# Patient Record
Sex: FEMALE | Race: BLACK OR AFRICAN AMERICAN | NOT HISPANIC OR LATINO | URBAN - METROPOLITAN AREA
[De-identification: names, ages, dates, MRNs, and addresses within clinical notes are randomized per-mention and may not be internally consistent; named-entity substitution may affect disease eponyms.]

---

## 2018-11-05 ENCOUNTER — HOSPITAL ENCOUNTER (EMERGENCY)
Facility: HOSPITAL | Age: 58
Discharge: HOME | End: 2018-11-05
Attending: EMERGENCY MEDICINE
Payer: COMMERCIAL

## 2018-11-05 VITALS
TEMPERATURE: 98 F | HEART RATE: 70 BPM | RESPIRATION RATE: 16 BRPM | SYSTOLIC BLOOD PRESSURE: 138 MMHG | OXYGEN SATURATION: 100 % | DIASTOLIC BLOOD PRESSURE: 79 MMHG

## 2018-11-05 DIAGNOSIS — R06.2 WHEEZING: ICD-10-CM

## 2018-11-05 DIAGNOSIS — R19.7 VOMITING AND DIARRHEA: ICD-10-CM

## 2018-11-05 DIAGNOSIS — M79.10 MYALGIA: Primary | ICD-10-CM

## 2018-11-05 DIAGNOSIS — R11.10 VOMITING AND DIARRHEA: ICD-10-CM

## 2018-11-05 DIAGNOSIS — Z72.0 TOBACCO ABUSE DISORDER: ICD-10-CM

## 2018-11-05 LAB
ALBUMIN SERPL BCP-MCNC: 3.7 G/DL
ALP SERPL-CCNC: 73 U/L
ALT SERPL W/O P-5'-P-CCNC: 15 U/L
ANION GAP SERPL CALC-SCNC: 11 MMOL/L
AST SERPL-CCNC: 24 U/L
BASOPHILS # BLD AUTO: 0.06 K/UL
BASOPHILS NFR BLD: 1.1 %
BILIRUB SERPL-MCNC: 0.8 MG/DL
BUN SERPL-MCNC: 14 MG/DL
CALCIUM SERPL-MCNC: 9 MG/DL
CHLORIDE SERPL-SCNC: 111 MMOL/L
CK SERPL-CCNC: 203 U/L
CO2 SERPL-SCNC: 25 MMOL/L
CREAT SERPL-MCNC: 0.8 MG/DL
DIFFERENTIAL METHOD: ABNORMAL
EOSINOPHIL # BLD AUTO: 0.2 K/UL
EOSINOPHIL NFR BLD: 3.8 %
ERYTHROCYTE [DISTWIDTH] IN BLOOD BY AUTOMATED COUNT: 13.6 %
EST. GFR  (AFRICAN AMERICAN): >60 ML/MIN/1.73 M^2
EST. GFR  (NON AFRICAN AMERICAN): >60 ML/MIN/1.73 M^2
GLUCOSE SERPL-MCNC: 90 MG/DL
HCT VFR BLD AUTO: 40 %
HGB BLD-MCNC: 13 G/DL
LYMPHOCYTES # BLD AUTO: 3.5 K/UL
LYMPHOCYTES NFR BLD: 62.8 %
MAGNESIUM SERPL-MCNC: 2 MG/DL
MCH RBC QN AUTO: 31 PG
MCHC RBC AUTO-ENTMCNC: 32.5 G/DL
MCV RBC AUTO: 95 FL
MONOCYTES # BLD AUTO: 0.3 K/UL
MONOCYTES NFR BLD: 5.4 %
NEUTROPHILS # BLD AUTO: 1.5 K/UL
NEUTROPHILS NFR BLD: 26.9 %
PLATELET # BLD AUTO: 268 K/UL
PMV BLD AUTO: 8.9 FL
POTASSIUM SERPL-SCNC: 3.7 MMOL/L
PROT SERPL-MCNC: 7.1 G/DL
RBC # BLD AUTO: 4.2 M/UL
SODIUM SERPL-SCNC: 147 MMOL/L
WBC # BLD AUTO: 5.51 K/UL

## 2018-11-05 PROCEDURE — 80053 COMPREHEN METABOLIC PANEL: CPT

## 2018-11-05 PROCEDURE — 25000003 PHARM REV CODE 250: Performed by: EMERGENCY MEDICINE

## 2018-11-05 PROCEDURE — 96360 HYDRATION IV INFUSION INIT: CPT

## 2018-11-05 PROCEDURE — 25000242 PHARM REV CODE 250 ALT 637 W/ HCPCS: Performed by: EMERGENCY MEDICINE

## 2018-11-05 PROCEDURE — 83735 ASSAY OF MAGNESIUM: CPT

## 2018-11-05 PROCEDURE — 94640 AIRWAY INHALATION TREATMENT: CPT

## 2018-11-05 PROCEDURE — 99283 EMERGENCY DEPT VISIT LOW MDM: CPT | Mod: 25

## 2018-11-05 PROCEDURE — 82550 ASSAY OF CK (CPK): CPT

## 2018-11-05 PROCEDURE — 85025 COMPLETE CBC W/AUTO DIFF WBC: CPT

## 2018-11-05 RX ORDER — ALBUTEROL SULFATE 90 UG/1
1-2 AEROSOL, METERED RESPIRATORY (INHALATION) EVERY 6 HOURS PRN
Qty: 1 INHALER | Refills: 0 | Status: SHIPPED | OUTPATIENT
Start: 2018-11-05 | End: 2023-06-13

## 2018-11-05 RX ORDER — NAPROXEN 500 MG/1
500 TABLET ORAL 2 TIMES DAILY WITH MEALS
Qty: 30 TABLET | Refills: 0 | Status: SHIPPED | OUTPATIENT
Start: 2018-11-05 | End: 2022-10-27

## 2018-11-05 RX ORDER — ONDANSETRON 4 MG/1
4 TABLET, FILM COATED ORAL EVERY 6 HOURS
Qty: 12 TABLET | Refills: 0 | Status: SHIPPED | OUTPATIENT
Start: 2018-11-05 | End: 2022-10-27

## 2018-11-05 RX ORDER — IPRATROPIUM BROMIDE AND ALBUTEROL SULFATE 2.5; .5 MG/3ML; MG/3ML
3 SOLUTION RESPIRATORY (INHALATION)
Status: COMPLETED | OUTPATIENT
Start: 2018-11-05 | End: 2018-11-05

## 2018-11-05 RX ADMIN — SODIUM CHLORIDE 1000 ML: 0.9 INJECTION, SOLUTION INTRAVENOUS at 10:11

## 2018-11-05 RX ADMIN — IPRATROPIUM BROMIDE AND ALBUTEROL SULFATE 3 ML: .5; 3 SOLUTION RESPIRATORY (INHALATION) at 10:11

## 2018-11-05 NOTE — ED PROVIDER NOTES
"SCRIBE #1 NOTE: I, Concha Bang, am scribing for, and in the presence of, Cayla Harry MD. I have scribed the entire note.       History     Chief Complaint   Patient presents with    Generalized Body Aches     body aches, vomiting and diarrhea     Review of patient's allergies indicates:  No Known Allergies      History of Present Illness     HPI    11/5/2018, 10:10 AM  History obtained from the patient      History of Present Illness: Lucero Townsend is a 57 y.o. female patient with who presents to the Emergency Department for generalized myalgias which onset gradually yesterday. She also c/o a "bad" cough and N/V/D. The pain is constant and moderate in severity. No mitigating or exacerbating factors reported. Patient denies fever, chills, congestion, sore throat, CP, SOB, leg swelling, abd pain, hematemesis, hematochezia, dizziness, lightheadedness, HA, and all other sxs at this time.      Arrival mode: Personal vehicle    PCP: Primary Doctor No      Past Medical History:  Past medical history reviewed not relevant      Past Surgical History:  Past surgical history reviewed not relevant      Family History:  Family history reviewed not relevant      Social History:  Social History    Social History Main Topics    Social History Main Topics    Smoking status: Unknown if ever smoked    Smokeless tobacco: Unknown if ever used    Alcohol Use: Unknown drinking history    Drug Use: Unknown if ever used    Sexual Activity: Unknown      Review of Systems     Review of Systems   Constitutional: Negative for chills and fever.   HENT: Negative for congestion and sore throat.    Respiratory: Positive for cough. Negative for shortness of breath.    Cardiovascular: Negative for chest pain.   Gastrointestinal: Positive for diarrhea, nausea and vomiting. Negative for abdominal pain, anal bleeding, blood in stool and constipation.   Genitourinary: Negative for dysuria.   Musculoskeletal: Positive for myalgias. " Negative for back pain.   Skin: Negative for rash.   Neurological: Negative for dizziness, weakness and light-headedness.   Hematological: Does not bruise/bleed easily.   All other systems reviewed and are negative.     Physical Exam     Initial Vitals [11/05/18 0942]   BP Pulse Resp Temp SpO2   131/80 110 (!) 26 98.5 °F (36.9 °C) 96 %      MAP       --          Physical Exam  Nursing Notes and Vital Signs Reviewed.  Constitutional: Patient is in no acute distress. Well-developed and well-nourished.  Head: Atraumatic. Normocephalic.  Eyes: PERRL. EOM intact. Conjunctivae are not pale. No scleral icterus.  ENT: Nares are clear and patent. Mucous membranes are dry. Oropharynx is clear and symmetric. No exudate. Uvula is midline. No stridor.  Neck: Supple. Full ROM. No lymphadenopathy.  Cardiovascular: Regular rate. Regular rhythm. No murmurs, rubs, or gallops. Distal pulses are 2+ and symmetric.  Pulmonary/Chest: No respiratory distress. There are faint expiratory wheezing. No rales or rhonchi.  Abdominal: Soft and non-distended.  There is no tenderness.  No rebound, guarding, or rigidity. Good bowel sounds.  Musculoskeletal: Moves all extremities. No obvious deformities. No edema. No calf tenderness.  Skin: Warm and dry.  Neurological:  Alert, awake, and appropriate.  Normal speech.  No acute focal neurological deficits are appreciated.  Psychiatric: Normal affect. Good eye contact. Appropriate in content.     ED Course   Procedures  ED Vital Signs:  Vitals:    11/05/18 0942 11/05/18 1011 11/05/18 1042 11/05/18 1112   BP: 131/80  123/68 138/79   Pulse: 110 64 69 70   Resp: (!) 26 20 18 16   Temp: 98.5 °F (36.9 °C)   98.1 °F (36.7 °C)   TempSrc: Oral      SpO2: 96% 100% 100%        Abnormal Lab Results:  Labs Reviewed   CBC W/ AUTO DIFFERENTIAL - Abnormal; Notable for the following components:       Result Value    MPV 8.9 (*)     Gran # (ANC) 1.5 (*)     Gran% 26.9 (*)     Lymph% 62.8 (*)     All other components  within normal limits   COMPREHENSIVE METABOLIC PANEL - Abnormal; Notable for the following components:    Sodium 147 (*)     Chloride 111 (*)     All other components within normal limits   CK - Abnormal; Notable for the following components:     (*)     All other components within normal limits   MAGNESIUM        All Lab Results:  Results for orders placed or performed during the hospital encounter of 11/05/18   CBC auto differential   Result Value Ref Range    WBC 5.51 3.90 - 12.70 K/uL    RBC 4.20 4.00 - 5.40 M/uL    Hemoglobin 13.0 12.0 - 16.0 g/dL    Hematocrit 40.0 37.0 - 48.5 %    MCV 95 82 - 98 fL    MCH 31.0 27.0 - 31.0 pg    MCHC 32.5 32.0 - 36.0 g/dL    RDW 13.6 11.5 - 14.5 %    Platelets 268 150 - 350 K/uL    MPV 8.9 (L) 9.2 - 12.9 fL    Gran # (ANC) 1.5 (L) 1.8 - 7.7 K/uL    Lymph # 3.5 1.0 - 4.8 K/uL    Mono # 0.3 0.3 - 1.0 K/uL    Eos # 0.2 0.0 - 0.5 K/uL    Baso # 0.06 0.00 - 0.20 K/uL    Gran% 26.9 (L) 38.0 - 73.0 %    Lymph% 62.8 (H) 18.0 - 48.0 %    Mono% 5.4 4.0 - 15.0 %    Eosinophil% 3.8 0.0 - 8.0 %    Basophil% 1.1 0.0 - 1.9 %    Differential Method Automated    Comprehensive metabolic panel   Result Value Ref Range    Sodium 147 (H) 136 - 145 mmol/L    Potassium 3.7 3.5 - 5.1 mmol/L    Chloride 111 (H) 95 - 110 mmol/L    CO2 25 23 - 29 mmol/L    Glucose 90 70 - 110 mg/dL    BUN, Bld 14 6 - 20 mg/dL    Creatinine 0.8 0.5 - 1.4 mg/dL    Calcium 9.0 8.7 - 10.5 mg/dL    Total Protein 7.1 6.0 - 8.4 g/dL    Albumin 3.7 3.5 - 5.2 g/dL    Total Bilirubin 0.8 0.1 - 1.0 mg/dL    Alkaline Phosphatase 73 55 - 135 U/L    AST 24 10 - 40 U/L    ALT 15 10 - 44 U/L    Anion Gap 11 8 - 16 mmol/L    eGFR if African American >60 >60 mL/min/1.73 m^2    eGFR if non African American >60 >60 mL/min/1.73 m^2   Magnesium   Result Value Ref Range    Magnesium 2.0 1.6 - 2.6 mg/dL   CPK   Result Value Ref Range     (H) 20 - 180 U/L          The Emergency Provider reviewed the vital signs and test results,  which are outlined above.     ED Discussion   11:10 AM: Discussed with pt all pertinent ED information and results. Discussed pt dx and plan to prescribe an albuterol inhaler for wheezing, Naproxen for body aches, and Zofran for nausea. Gave pt all f/u and return to the ED instructions. All questions and concerns were addressed at this time. Pt expresses understanding of information and instructions, and is comfortable with plan to discharge. Pt is stable for discharge.    I discussed with patient and/or family/caretaker that evaluation in the ED does not suggest any emergent or life threatening medical conditions requiring immediate intervention beyond what was provided in the ED, and I believe patient is safe for discharge.  Regardless, an unremarkable evaluation in the ED does not preclude the development or presence of a serious of life threatening condition. As such, patient was instructed to return immediately for any worsening or change in current symptoms.  ED Medication(s):  Medications   albuterol-ipratropium 2.5 mg-0.5 mg/3 mL nebulizer solution 3 mL (3 mLs Nebulization Given 11/5/18 1011)   sodium chloride 0.9% bolus 1,000 mL (0 mLs Intravenous Stopped 11/5/18 1128)     Current Discharge Medication List      START taking these medications    Details   albuterol (PROVENTIL/VENTOLIN HFA) 90 mcg/actuation inhaler Inhale 1-2 puffs into the lungs every 6 (six) hours as needed for Wheezing. Rescue  Qty: 1 Inhaler, Refills: 0      naproxen (NAPROSYN) 500 MG tablet Take 1 tablet (500 mg total) by mouth 2 (two) times daily with meals.  Qty: 30 tablet, Refills: 0      ondansetron (ZOFRAN) 4 MG tablet Take 1 tablet (4 mg total) by mouth every 6 (six) hours.  Qty: 12 tablet, Refills: 0           Follow-up Information     [facility]. Schedule an appointment as soon as possible for a visit in 2 days.    Why:  Return to the Emergency Room, If symptoms worsen  Contact information:  [id number]  [facility] [id number]  [phone]                    Medical Decision Making     Medical Decision Making:   Clinical Tests:   Lab Tests: Ordered and Reviewed     Additional MDM:   Smoking Cessation: The patient is a smoker. The patient was counseled on smoking cessation for: 3 minutes. The patient was counseled on tobacco related  health complications. Appropriate patient literature was given to the patient concerning tobacco cessation. The patient was counseled on the adverse effects of second hand smoke.      Scribe Attestation:   Scribe #1: I performed the above scribed service and the documentation accurately describes the services I performed. I attest to the accuracy of the note.     Attending:   Physician Attestation Statement for Scribe #1: I, Cayla Harry MD, personally performed the services described in this documentation, as scribed by Concha Bang, in my presence, and it is both accurate and complete.           Clinical Impression       ICD-10-CM ICD-9-CM   1. Myalgia M79.10 729.1   2. Wheezing R06.2 786.07   3. Tobacco abuse disorder Z72.0 305.1   4. Vomiting and diarrhea R11.10 787.03    R19.7 787.91       Disposition:   Disposition: Discharged  Condition: Stable           Cayla Harry MD  11/07/18 0615

## 2018-11-05 NOTE — ED NOTES
Patient does not want to delay DC time to be able to receive remainder of IV fluids. Patient states she has to get to work so she has to leave now. She states I feel better

## 2022-10-27 ENCOUNTER — HOSPITAL ENCOUNTER (OUTPATIENT)
Facility: HOSPITAL | Age: 62
Discharge: HOME | DRG: 872 | End: 2022-10-31
Attending: EMERGENCY MEDICINE | Admitting: INTERNAL MEDICINE
Payer: MEDICAID

## 2022-10-27 DIAGNOSIS — J45.41 MODERATE PERSISTENT ASTHMA WITH ACUTE EXACERBATION: ICD-10-CM

## 2022-10-27 DIAGNOSIS — R79.89 ELEVATED LACTIC ACID LEVEL: ICD-10-CM

## 2022-10-27 DIAGNOSIS — A08.4 GASTROENTERITIS AND COLITIS, VIRAL: ICD-10-CM

## 2022-10-27 DIAGNOSIS — R06.02 SOB (SHORTNESS OF BREATH): ICD-10-CM

## 2022-10-27 DIAGNOSIS — K52.9 COLITIS: Primary | ICD-10-CM

## 2022-10-27 DIAGNOSIS — J10.1 INFLUENZA A: ICD-10-CM

## 2022-10-27 DIAGNOSIS — R07.9 CHEST PAIN: ICD-10-CM

## 2022-10-27 DIAGNOSIS — E87.6 HYPOKALEMIA: ICD-10-CM

## 2022-10-27 DIAGNOSIS — R10.84 GENERALIZED ABDOMINAL PAIN: ICD-10-CM

## 2022-10-27 PROBLEM — J45.901 ASTHMA WITH ACUTE EXACERBATION: Status: ACTIVE | Noted: 2022-10-27

## 2022-10-27 PROBLEM — R11.2 NAUSEA & VOMITING: Status: ACTIVE | Noted: 2022-10-27

## 2022-10-27 PROBLEM — A41.9 SEPSIS: Status: ACTIVE | Noted: 2022-10-27

## 2022-10-27 PROBLEM — E66.9 OBESITY (BMI 30-39.9): Status: ACTIVE | Noted: 2022-10-27

## 2022-10-27 PROBLEM — F17.200 CURRENT EVERY DAY SMOKER: Status: ACTIVE | Noted: 2022-10-27

## 2022-10-27 PROBLEM — D53.9 MACROCYTIC ANEMIA: Status: ACTIVE | Noted: 2022-10-27

## 2022-10-27 PROBLEM — E87.20 METABOLIC ACIDOSIS: Status: ACTIVE | Noted: 2022-10-27

## 2022-10-27 LAB
ALBUMIN SERPL BCP-MCNC: 4 G/DL (ref 3.5–5.2)
ALP SERPL-CCNC: 69 U/L (ref 55–135)
ALT SERPL W/O P-5'-P-CCNC: 10 U/L (ref 10–44)
ANION GAP SERPL CALC-SCNC: 21 MMOL/L (ref 8–16)
AST SERPL-CCNC: 25 U/L (ref 10–40)
BASOPHILS # BLD AUTO: 0.02 K/UL (ref 0–0.2)
BASOPHILS NFR BLD: 0.2 % (ref 0–1.9)
BILIRUB SERPL-MCNC: 0.4 MG/DL (ref 0.1–1)
BNP SERPL-MCNC: 60 PG/ML (ref 0–99)
BUN SERPL-MCNC: 7 MG/DL (ref 6–20)
CALCIUM SERPL-MCNC: 8 MG/DL (ref 8.7–10.5)
CHLORIDE SERPL-SCNC: 104 MMOL/L (ref 95–110)
CK SERPL-CCNC: 235 U/L (ref 20–180)
CO2 SERPL-SCNC: 17 MMOL/L (ref 23–29)
CREAT SERPL-MCNC: 0.9 MG/DL (ref 0.5–1.4)
CTP QC/QA: YES
CTP QC/QA: YES
DIFFERENTIAL METHOD: ABNORMAL
EOSINOPHIL # BLD AUTO: 0 K/UL (ref 0–0.5)
EOSINOPHIL NFR BLD: 0 % (ref 0–8)
ERYTHROCYTE [DISTWIDTH] IN BLOOD BY AUTOMATED COUNT: 17.1 % (ref 11.5–14.5)
EST. GFR  (NO RACE VARIABLE): >60 ML/MIN/1.73 M^2
GLUCOSE SERPL-MCNC: 139 MG/DL (ref 70–110)
HCT VFR BLD AUTO: 32.7 % (ref 37–48.5)
HGB BLD-MCNC: 11.3 G/DL (ref 12–16)
IMM GRANULOCYTES # BLD AUTO: 0.06 K/UL (ref 0–0.04)
IMM GRANULOCYTES NFR BLD AUTO: 0.7 % (ref 0–0.5)
INR PPP: 1.1 (ref 0.8–1.2)
LACTATE SERPL-SCNC: 5.8 MMOL/L (ref 0.5–2.2)
LACTATE SERPL-SCNC: 9.6 MMOL/L (ref 0.5–2.2)
LIPASE SERPL-CCNC: 30 U/L (ref 4–60)
LYMPHOCYTES # BLD AUTO: 0.8 K/UL (ref 1–4.8)
LYMPHOCYTES NFR BLD: 8.2 % (ref 18–48)
MCH RBC QN AUTO: 36.1 PG (ref 27–31)
MCHC RBC AUTO-ENTMCNC: 34.6 G/DL (ref 32–36)
MCV RBC AUTO: 105 FL (ref 82–98)
MONOCYTES # BLD AUTO: 0.2 K/UL (ref 0.3–1)
MONOCYTES NFR BLD: 2.5 % (ref 4–15)
NEUTROPHILS # BLD AUTO: 8 K/UL (ref 1.8–7.7)
NEUTROPHILS NFR BLD: 88.4 % (ref 38–73)
NRBC BLD-RTO: 0 /100 WBC
PLATELET # BLD AUTO: 250 K/UL (ref 150–450)
PMV BLD AUTO: 9.1 FL (ref 9.2–12.9)
POC MOLECULAR INFLUENZA A AGN: POSITIVE
POC MOLECULAR INFLUENZA B AGN: NEGATIVE
POTASSIUM SERPL-SCNC: 2.3 MMOL/L (ref 3.5–5.1)
PROCALCITONIN SERPL IA-MCNC: 0.05 NG/ML
PROT SERPL-MCNC: 6.8 G/DL (ref 6–8.4)
PROTHROMBIN TIME: 11.9 SEC (ref 9–12.5)
RBC # BLD AUTO: 3.13 M/UL (ref 4–5.4)
SARS-COV-2 RDRP RESP QL NAA+PROBE: NEGATIVE
SODIUM SERPL-SCNC: 142 MMOL/L (ref 136–145)
TROPONIN I SERPL DL<=0.01 NG/ML-MCNC: 0.01 NG/ML (ref 0–0.03)
WBC # BLD AUTO: 9.1 K/UL (ref 3.9–12.7)

## 2022-10-27 PROCEDURE — 36415 COLL VENOUS BLD VENIPUNCTURE: CPT | Performed by: NURSE PRACTITIONER

## 2022-10-27 PROCEDURE — 94640 AIRWAY INHALATION TREATMENT: CPT | Mod: XB

## 2022-10-27 PROCEDURE — G0378 HOSPITAL OBSERVATION PER HR: HCPCS

## 2022-10-27 PROCEDURE — 99285 EMERGENCY DEPT VISIT HI MDM: CPT | Mod: 25

## 2022-10-27 PROCEDURE — 96365 THER/PROPH/DIAG IV INF INIT: CPT | Mod: 59

## 2022-10-27 PROCEDURE — 83605 ASSAY OF LACTIC ACID: CPT | Mod: 91 | Performed by: INTERNAL MEDICINE

## 2022-10-27 PROCEDURE — 93010 EKG 12-LEAD: ICD-10-PCS | Mod: ,,, | Performed by: INTERNAL MEDICINE

## 2022-10-27 PROCEDURE — 93010 ELECTROCARDIOGRAM REPORT: CPT | Mod: ,,, | Performed by: INTERNAL MEDICINE

## 2022-10-27 PROCEDURE — 63600175 PHARM REV CODE 636 W HCPCS: Performed by: INTERNAL MEDICINE

## 2022-10-27 PROCEDURE — 21400001 HC TELEMETRY ROOM

## 2022-10-27 PROCEDURE — 25000003 PHARM REV CODE 250: Performed by: NURSE PRACTITIONER

## 2022-10-27 PROCEDURE — 94761 N-INVAS EAR/PLS OXIMETRY MLT: CPT

## 2022-10-27 PROCEDURE — 83880 ASSAY OF NATRIURETIC PEPTIDE: CPT | Performed by: EMERGENCY MEDICINE

## 2022-10-27 PROCEDURE — 96361 HYDRATE IV INFUSION ADD-ON: CPT

## 2022-10-27 PROCEDURE — 84145 PROCALCITONIN (PCT): CPT | Performed by: EMERGENCY MEDICINE

## 2022-10-27 PROCEDURE — 84484 ASSAY OF TROPONIN QUANT: CPT | Performed by: EMERGENCY MEDICINE

## 2022-10-27 PROCEDURE — 27000221 HC OXYGEN, UP TO 24 HOURS

## 2022-10-27 PROCEDURE — 63600175 PHARM REV CODE 636 W HCPCS: Performed by: EMERGENCY MEDICINE

## 2022-10-27 PROCEDURE — 96375 TX/PRO/DX INJ NEW DRUG ADDON: CPT

## 2022-10-27 PROCEDURE — 82550 ASSAY OF CK (CPK): CPT | Performed by: NURSE PRACTITIONER

## 2022-10-27 PROCEDURE — 93005 ELECTROCARDIOGRAM TRACING: CPT

## 2022-10-27 PROCEDURE — 87502 INFLUENZA DNA AMP PROBE: CPT

## 2022-10-27 PROCEDURE — 36415 COLL VENOUS BLD VENIPUNCTURE: CPT | Performed by: INTERNAL MEDICINE

## 2022-10-27 PROCEDURE — 25000242 PHARM REV CODE 250 ALT 637 W/ HCPCS: Performed by: EMERGENCY MEDICINE

## 2022-10-27 PROCEDURE — 25000003 PHARM REV CODE 250: Performed by: INTERNAL MEDICINE

## 2022-10-27 PROCEDURE — 87635 SARS-COV-2 COVID-19 AMP PRB: CPT | Performed by: EMERGENCY MEDICINE

## 2022-10-27 PROCEDURE — 83605 ASSAY OF LACTIC ACID: CPT | Performed by: EMERGENCY MEDICINE

## 2022-10-27 PROCEDURE — 96376 TX/PRO/DX INJ SAME DRUG ADON: CPT

## 2022-10-27 PROCEDURE — 80053 COMPREHEN METABOLIC PANEL: CPT | Performed by: EMERGENCY MEDICINE

## 2022-10-27 PROCEDURE — 83690 ASSAY OF LIPASE: CPT | Performed by: EMERGENCY MEDICINE

## 2022-10-27 PROCEDURE — 25500020 PHARM REV CODE 255: Performed by: EMERGENCY MEDICINE

## 2022-10-27 PROCEDURE — 87040 BLOOD CULTURE FOR BACTERIA: CPT | Performed by: EMERGENCY MEDICINE

## 2022-10-27 PROCEDURE — 85610 PROTHROMBIN TIME: CPT | Performed by: EMERGENCY MEDICINE

## 2022-10-27 PROCEDURE — 25000242 PHARM REV CODE 250 ALT 637 W/ HCPCS: Performed by: INTERNAL MEDICINE

## 2022-10-27 PROCEDURE — 85025 COMPLETE CBC W/AUTO DIFF WBC: CPT | Performed by: EMERGENCY MEDICINE

## 2022-10-27 RX ORDER — ONDANSETRON 2 MG/ML
4 INJECTION INTRAMUSCULAR; INTRAVENOUS EVERY 8 HOURS PRN
Status: DISCONTINUED | OUTPATIENT
Start: 2022-10-27 | End: 2022-10-31 | Stop reason: HOSPADM

## 2022-10-27 RX ORDER — POTASSIUM CHLORIDE 29.8 MG/ML
40 INJECTION INTRAVENOUS
Status: DISCONTINUED | OUTPATIENT
Start: 2022-10-27 | End: 2022-10-27 | Stop reason: CLARIF

## 2022-10-27 RX ORDER — METHYLPREDNISOLONE 4 MG/1
4 TABLET ORAL DAILY
Status: ON HOLD | COMMUNITY
End: 2022-10-31 | Stop reason: HOSPADM

## 2022-10-27 RX ORDER — TALC
6 POWDER (GRAM) TOPICAL NIGHTLY PRN
Status: DISCONTINUED | OUTPATIENT
Start: 2022-10-27 | End: 2022-10-31 | Stop reason: HOSPADM

## 2022-10-27 RX ORDER — SODIUM BICARBONATE 650 MG/1
650 TABLET ORAL 3 TIMES DAILY
Status: COMPLETED | OUTPATIENT
Start: 2022-10-27 | End: 2022-10-29

## 2022-10-27 RX ORDER — IPRATROPIUM BROMIDE AND ALBUTEROL SULFATE 2.5; .5 MG/3ML; MG/3ML
3 SOLUTION RESPIRATORY (INHALATION)
Status: COMPLETED | OUTPATIENT
Start: 2022-10-27 | End: 2022-10-27

## 2022-10-27 RX ORDER — AMLODIPINE BESYLATE 5 MG/1
5 TABLET ORAL 2 TIMES DAILY
Status: DISCONTINUED | OUTPATIENT
Start: 2022-10-27 | End: 2022-10-31 | Stop reason: HOSPADM

## 2022-10-27 RX ORDER — IPRATROPIUM BROMIDE AND ALBUTEROL SULFATE 2.5; .5 MG/3ML; MG/3ML
3 SOLUTION RESPIRATORY (INHALATION) EVERY 6 HOURS
Status: DISCONTINUED | OUTPATIENT
Start: 2022-10-27 | End: 2022-10-29

## 2022-10-27 RX ORDER — METRONIDAZOLE 500 MG/1
500 TABLET ORAL EVERY 8 HOURS
Status: DISCONTINUED | OUTPATIENT
Start: 2022-10-27 | End: 2022-10-31 | Stop reason: HOSPADM

## 2022-10-27 RX ORDER — BUDESONIDE 0.5 MG/2ML
0.5 INHALANT ORAL EVERY 12 HOURS
Status: DISCONTINUED | OUTPATIENT
Start: 2022-10-27 | End: 2022-10-31 | Stop reason: HOSPADM

## 2022-10-27 RX ORDER — CIPROFLOXACIN 500 MG/1
500 TABLET ORAL
Status: ON HOLD | COMMUNITY
End: 2022-10-31 | Stop reason: SDUPTHER

## 2022-10-27 RX ORDER — MORPHINE SULFATE 4 MG/ML
4 INJECTION, SOLUTION INTRAMUSCULAR; INTRAVENOUS
Status: COMPLETED | OUTPATIENT
Start: 2022-10-27 | End: 2022-10-27

## 2022-10-27 RX ORDER — HYDRALAZINE HYDROCHLORIDE 20 MG/ML
10 INJECTION INTRAMUSCULAR; INTRAVENOUS EVERY 8 HOURS PRN
Status: DISCONTINUED | OUTPATIENT
Start: 2022-10-27 | End: 2022-10-31 | Stop reason: HOSPADM

## 2022-10-27 RX ORDER — METRONIDAZOLE 500 MG/1
500 TABLET ORAL
Status: ON HOLD | COMMUNITY
End: 2022-10-31 | Stop reason: SDUPTHER

## 2022-10-27 RX ORDER — OSELTAMIVIR PHOSPHATE 75 MG/1
75 CAPSULE ORAL 2 TIMES DAILY
Status: DISCONTINUED | OUTPATIENT
Start: 2022-10-27 | End: 2022-10-31 | Stop reason: HOSPADM

## 2022-10-27 RX ORDER — ONDANSETRON 2 MG/ML
4 INJECTION INTRAMUSCULAR; INTRAVENOUS ONCE
Status: COMPLETED | OUTPATIENT
Start: 2022-10-27 | End: 2022-10-27

## 2022-10-27 RX ORDER — CIPROFLOXACIN 2 MG/ML
400 INJECTION, SOLUTION INTRAVENOUS
Status: DISCONTINUED | OUTPATIENT
Start: 2022-10-27 | End: 2022-10-31 | Stop reason: HOSPADM

## 2022-10-27 RX ORDER — ACETAMINOPHEN 325 MG/1
650 TABLET ORAL EVERY 4 HOURS PRN
Status: DISCONTINUED | OUTPATIENT
Start: 2022-10-27 | End: 2022-10-31 | Stop reason: HOSPADM

## 2022-10-27 RX ORDER — PANTOPRAZOLE SODIUM 40 MG/10ML
80 INJECTION, POWDER, LYOPHILIZED, FOR SOLUTION INTRAVENOUS ONCE
Status: COMPLETED | OUTPATIENT
Start: 2022-10-28 | End: 2022-10-28

## 2022-10-27 RX ORDER — POTASSIUM CHLORIDE 7.45 MG/ML
10 INJECTION INTRAVENOUS
Status: DISPENSED | OUTPATIENT
Start: 2022-10-27 | End: 2022-10-27

## 2022-10-27 RX ORDER — HYDROCODONE BITARTRATE AND ACETAMINOPHEN 5; 325 MG/1; MG/1
1 TABLET ORAL EVERY 4 HOURS PRN
Status: DISCONTINUED | OUTPATIENT
Start: 2022-10-27 | End: 2022-10-31 | Stop reason: HOSPADM

## 2022-10-27 RX ORDER — HYOSCYAMINE SULFATE 0.12 MG/1
0.12 TABLET SUBLINGUAL EVERY 4 HOURS PRN
Status: DISCONTINUED | OUTPATIENT
Start: 2022-10-27 | End: 2022-10-31 | Stop reason: HOSPADM

## 2022-10-27 RX ORDER — SODIUM CHLORIDE AND POTASSIUM CHLORIDE 150; 450 MG/100ML; MG/100ML
INJECTION, SOLUTION INTRAVENOUS CONTINUOUS
Status: DISPENSED | OUTPATIENT
Start: 2022-10-27 | End: 2022-10-28

## 2022-10-27 RX ORDER — METHYLPREDNISOLONE SOD SUCC 125 MG
125 VIAL (EA) INJECTION
Status: COMPLETED | OUTPATIENT
Start: 2022-10-27 | End: 2022-10-27

## 2022-10-27 RX ORDER — PANTOPRAZOLE SODIUM 40 MG/10ML
40 INJECTION, POWDER, LYOPHILIZED, FOR SOLUTION INTRAVENOUS EVERY 12 HOURS
Status: DISCONTINUED | OUTPATIENT
Start: 2022-10-28 | End: 2022-10-31 | Stop reason: HOSPADM

## 2022-10-27 RX ORDER — CETIRIZINE HYDROCHLORIDE 10 MG/1
10 TABLET ORAL NIGHTLY
Status: DISCONTINUED | OUTPATIENT
Start: 2022-10-27 | End: 2022-10-31 | Stop reason: HOSPADM

## 2022-10-27 RX ORDER — SODIUM CHLORIDE 0.9 % (FLUSH) 0.9 %
10 SYRINGE (ML) INJECTION
Status: DISCONTINUED | OUTPATIENT
Start: 2022-10-27 | End: 2022-10-31 | Stop reason: HOSPADM

## 2022-10-27 RX ORDER — HYDROCODONE BITARTRATE AND ACETAMINOPHEN 10; 325 MG/1; MG/1
1 TABLET ORAL EVERY 4 HOURS PRN
Status: DISCONTINUED | OUTPATIENT
Start: 2022-10-27 | End: 2022-10-31 | Stop reason: HOSPADM

## 2022-10-27 RX ORDER — FAMOTIDINE 20 MG/1
20 TABLET, FILM COATED ORAL 2 TIMES DAILY
Status: DISCONTINUED | OUTPATIENT
Start: 2022-10-27 | End: 2022-10-27

## 2022-10-27 RX ADMIN — ONDANSETRON 4 MG: 2 INJECTION INTRAMUSCULAR; INTRAVENOUS at 03:10

## 2022-10-27 RX ADMIN — CETIRIZINE HYDROCHLORIDE 10 MG: 10 TABLET, FILM COATED ORAL at 08:10

## 2022-10-27 RX ADMIN — AMLODIPINE BESYLATE 5 MG: 5 TABLET ORAL at 08:10

## 2022-10-27 RX ADMIN — IPRATROPIUM BROMIDE AND ALBUTEROL SULFATE 3 ML: 2.5; .5 SOLUTION RESPIRATORY (INHALATION) at 12:10

## 2022-10-27 RX ADMIN — POTASSIUM CHLORIDE 10 MEQ: 7.46 INJECTION, SOLUTION INTRAVENOUS at 05:10

## 2022-10-27 RX ADMIN — BUDESONIDE 0.5 MG: 0.5 INHALANT ORAL at 07:10

## 2022-10-27 RX ADMIN — IOHEXOL 100 ML: 350 INJECTION, SOLUTION INTRAVENOUS at 01:10

## 2022-10-27 RX ADMIN — POTASSIUM CHLORIDE AND SODIUM CHLORIDE: 450; 150 INJECTION, SOLUTION INTRAVENOUS at 09:10

## 2022-10-27 RX ADMIN — SODIUM BICARBONATE 650 MG: 650 TABLET ORAL at 08:10

## 2022-10-27 RX ADMIN — METHYLPREDNISOLONE SODIUM SUCCINATE 125 MG: 125 INJECTION, POWDER, FOR SOLUTION INTRAMUSCULAR; INTRAVENOUS at 01:10

## 2022-10-27 RX ADMIN — POTASSIUM CHLORIDE 10 MEQ: 7.46 INJECTION, SOLUTION INTRAVENOUS at 06:10

## 2022-10-27 RX ADMIN — SODIUM CHLORIDE 1000 ML: 0.9 INJECTION, SOLUTION INTRAVENOUS at 08:10

## 2022-10-27 RX ADMIN — METRONIDAZOLE 500 MG: 500 TABLET ORAL at 09:10

## 2022-10-27 RX ADMIN — CIPROFLOXACIN 400 MG: 2 INJECTION, SOLUTION INTRAVENOUS at 05:10

## 2022-10-27 RX ADMIN — HYDROCODONE BITARTRATE AND ACETAMINOPHEN 1 TABLET: 10; 325 TABLET ORAL at 05:10

## 2022-10-27 RX ADMIN — FAMOTIDINE 20 MG: 20 TABLET ORAL at 08:10

## 2022-10-27 RX ADMIN — HYDRALAZINE HYDROCHLORIDE 10 MG: 20 INJECTION, SOLUTION INTRAMUSCULAR; INTRAVENOUS at 05:10

## 2022-10-27 RX ADMIN — OSELTAMIVIR PHOSPHATE 75 MG: 75 CAPSULE ORAL at 08:10

## 2022-10-27 RX ADMIN — SODIUM CHLORIDE, SODIUM LACTATE, POTASSIUM CHLORIDE, AND CALCIUM CHLORIDE 3039 ML: .6; .31; .03; .02 INJECTION, SOLUTION INTRAVENOUS at 03:10

## 2022-10-27 RX ADMIN — MORPHINE SULFATE 4 MG: 4 INJECTION INTRAVENOUS at 03:10

## 2022-10-27 RX ADMIN — METHYLPREDNISOLONE SODIUM SUCCINATE 40 MG: 40 INJECTION, POWDER, FOR SOLUTION INTRAMUSCULAR; INTRAVENOUS at 05:10

## 2022-10-27 RX ADMIN — IPRATROPIUM BROMIDE AND ALBUTEROL SULFATE 3 ML: 2.5; .5 SOLUTION RESPIRATORY (INHALATION) at 07:10

## 2022-10-27 RX ADMIN — POTASSIUM CHLORIDE 10 MEQ: 7.46 INJECTION, SOLUTION INTRAVENOUS at 03:10

## 2022-10-27 NOTE — ASSESSMENT & PLAN NOTE
Body mass index is 32.98 kg/m². Morbid obesity complicates all aspects of disease management from diagnostic modalities to treatment. Weight loss encouraged and health benefits explained to patient.

## 2022-10-27 NOTE — SUBJECTIVE & OBJECTIVE
Past Medical History:   Diagnosis Date    Asthma        History reviewed. No pertinent surgical history.    Review of patient's allergies indicates:  No Known Allergies    No current facility-administered medications on file prior to encounter.     Current Outpatient Medications on File Prior to Encounter   Medication Sig    albuterol (PROVENTIL/VENTOLIN HFA) 90 mcg/actuation inhaler Inhale 1-2 puffs into the lungs every 6 (six) hours as needed for Wheezing. Rescue    ciprofloxacin HCl (CIPRO) 500 MG tablet Take 500 mg by mouth every 12 (twelve) hours.    methylPREDNISolone (MEDROL) 4 MG Tab Take 4 mg by mouth once daily.    metroNIDAZOLE (FLAGYL) 500 MG tablet Take 500 mg by mouth every 8 (eight) hours.    [DISCONTINUED] naproxen (NAPROSYN) 500 MG tablet Take 1 tablet (500 mg total) by mouth 2 (two) times daily with meals.    [DISCONTINUED] ondansetron (ZOFRAN) 4 MG tablet Take 1 tablet (4 mg total) by mouth every 6 (six) hours.     Family History       Problem Relation (Age of Onset)    Cancer Mother    Diabetes Mother, Father          Tobacco Use    Smoking status: Every Day     Types: Cigarettes    Smokeless tobacco: Never   Substance and Sexual Activity    Alcohol use: Not on file     Comment: occ    Drug use: Never    Sexual activity: Not on file     Review of Systems   Constitutional:  Positive for activity change, appetite change, chills, fatigue and fever.   HENT:  Negative for sore throat.    Eyes:  Negative for visual disturbance.   Respiratory:  Positive for cough and shortness of breath. Negative for chest tightness.    Cardiovascular:  Negative for chest pain, palpitations and leg swelling.   Gastrointestinal:  Positive for abdominal pain, diarrhea, nausea and vomiting. Negative for abdominal distention and constipation.   Endocrine: Negative for polyuria.   Genitourinary:  Negative for decreased urine volume, dysuria, flank pain, frequency and hematuria.   Musculoskeletal:  Negative for back pain and  gait problem.   Skin:  Negative for rash.   Neurological:  Negative for syncope, speech difficulty, weakness, light-headedness and headaches.   Psychiatric/Behavioral:  Negative for confusion, hallucinations and sleep disturbance.    Objective:     Vital Signs (Most Recent):  Temp: 98.7 °F (37.1 °C) (10/27/22 1512)  Pulse: 81 (10/27/22 1715)  Resp: (!) 22 (10/27/22 1723)  BP: (!) 160/74 (10/27/22 1715)  SpO2: 96 % (10/27/22 1715)   Vital Signs (24h Range):  Temp:  [98.2 °F (36.8 °C)-98.9 °F (37.2 °C)] 98.7 °F (37.1 °C)  Pulse:  [] 81  Resp:  [20-30] 22  SpO2:  [92 %-99 %] 96 %  BP: (140-194)/(74-90) 160/74     Weight: 101.3 kg (223 lb 5.2 oz)  Body mass index is 32.98 kg/m².    Physical Exam  Constitutional:       General: She is not in acute distress.     Appearance: She is well-developed. She is ill-appearing. She is not diaphoretic.   HENT:      Head: Normocephalic and atraumatic.      Mouth/Throat:      Mouth: Mucous membranes are dry.      Pharynx: No oropharyngeal exudate.   Eyes:      Conjunctiva/sclera: Conjunctivae normal.      Pupils: Pupils are equal, round, and reactive to light.   Neck:      Thyroid: No thyromegaly.      Vascular: No JVD.   Cardiovascular:      Rate and Rhythm: Normal rate and regular rhythm.      Heart sounds: Normal heart sounds. No murmur heard.  Pulmonary:      Effort: Pulmonary effort is normal. No respiratory distress.      Breath sounds: Examination of the right-upper field reveals wheezing and rhonchi. Examination of the left-upper field reveals wheezing and rhonchi. Examination of the right-middle field reveals wheezing and rhonchi. Examination of the left-middle field reveals wheezing and rhonchi. Examination of the right-lower field reveals wheezing and rhonchi. Examination of the left-lower field reveals wheezing and rhonchi. Wheezing and rhonchi present. No rales.   Chest:      Chest wall: No tenderness.   Abdominal:      General: Bowel sounds are normal. There is no  distension.      Palpations: Abdomen is soft.      Tenderness: There is abdominal tenderness (generalzied , more across lower abd , worst LLQ). There is no guarding or rebound.   Musculoskeletal:         General: Normal range of motion.      Cervical back: Normal range of motion and neck supple.      Right lower leg: No edema.      Left lower leg: No edema.   Lymphadenopathy:      Cervical: No cervical adenopathy.   Skin:     General: Skin is warm and dry.      Findings: No rash.   Neurological:      General: No focal deficit present.      Mental Status: She is alert and oriented to person, place, and time.      Cranial Nerves: No cranial nerve deficit.      Sensory: No sensory deficit.      Deep Tendon Reflexes: Reflexes normal.   Psychiatric:         Mood and Affect: Mood normal.         CRANIAL NERVES     CN III, IV, VI   Pupils are equal, round, and reactive to light.     Significant Labs:   Results for orders placed or performed during the hospital encounter of 10/27/22   CBC auto differential   Result Value Ref Range    WBC 9.10 3.90 - 12.70 K/uL    RBC 3.13 (L) 4.00 - 5.40 M/uL    Hemoglobin 11.3 (L) 12.0 - 16.0 g/dL    Hematocrit 32.7 (L) 37.0 - 48.5 %     (H) 82 - 98 fL    MCH 36.1 (H) 27.0 - 31.0 pg    MCHC 34.6 32.0 - 36.0 g/dL    RDW 17.1 (H) 11.5 - 14.5 %    Platelets 250 150 - 450 K/uL    MPV 9.1 (L) 9.2 - 12.9 fL    Immature Granulocytes 0.7 (H) 0.0 - 0.5 %    Gran # (ANC) 8.0 (H) 1.8 - 7.7 K/uL    Immature Grans (Abs) 0.06 (H) 0.00 - 0.04 K/uL    Lymph # 0.8 (L) 1.0 - 4.8 K/uL    Mono # 0.2 (L) 0.3 - 1.0 K/uL    Eos # 0.0 0.0 - 0.5 K/uL    Baso # 0.02 0.00 - 0.20 K/uL    nRBC 0 0 /100 WBC    Gran % 88.4 (H) 38.0 - 73.0 %    Lymph % 8.2 (L) 18.0 - 48.0 %    Mono % 2.5 (L) 4.0 - 15.0 %    Eosinophil % 0.0 0.0 - 8.0 %    Basophil % 0.2 0.0 - 1.9 %    Differential Method Automated    Comprehensive metabolic panel   Result Value Ref Range    Sodium 142 136 - 145 mmol/L    Potassium 2.3 (LL) 3.5 -  5.1 mmol/L    Chloride 104 95 - 110 mmol/L    CO2 17 (L) 23 - 29 mmol/L    Glucose 139 (H) 70 - 110 mg/dL    BUN 7 6 - 20 mg/dL    Creatinine 0.9 0.5 - 1.4 mg/dL    Calcium 8.0 (L) 8.7 - 10.5 mg/dL    Total Protein 6.8 6.0 - 8.4 g/dL    Albumin 4.0 3.5 - 5.2 g/dL    Total Bilirubin 0.4 0.1 - 1.0 mg/dL    Alkaline Phosphatase 69 55 - 135 U/L    AST 25 10 - 40 U/L    ALT 10 10 - 44 U/L    Anion Gap 21 (H) 8 - 16 mmol/L    eGFR >60 >60 mL/min/1.73 m^2   Troponin I   Result Value Ref Range    Troponin I 0.009 0.000 - 0.026 ng/mL   Brain natriuretic peptide   Result Value Ref Range    BNP 60 0 - 99 pg/mL   Protime-INR   Result Value Ref Range    Prothrombin Time 11.9 9.0 - 12.5 sec    INR 1.1 0.8 - 1.2   Lipase   Result Value Ref Range    Lipase 30 4 - 60 U/L   Lactic acid, plasma   Result Value Ref Range    Lactate (Lactic Acid) 5.8 (HH) 0.5 - 2.2 mmol/L   Procalcitonin   Result Value Ref Range    Procalcitonin 0.05 <0.25 ng/mL   POCT COVID-19 Rapid Screening   Result Value Ref Range    POC Rapid COVID Negative Negative     Acceptable Yes    POCT Influenza A/B Molecular   Result Value Ref Range    POC Molecular Influenza A Ag Positive (A) Negative, Not Reported    POC Molecular Influenza B Ag Negative Negative, Not Reported     Acceptable Yes          Significant Imaging:   Imaging Results              CT Abdomen Pelvis With Contrast (Final result)  Result time 10/27/22 14:21:18      Final result by Magdiel Richardson MD (10/27/22 14:21:18)                   Impression:      Mild thickening of the transverse and descending colon which could reflect mild infectious or inflammatory colitis.  No abdominal abscess identified    All CT scans at this facility use dose modulation, iterative reconstruction, and/or weight based dosing when appropriate to reduce radiation dose to as low as reasonable achievable.      Electronically signed by: Magdiel Richardson MD  Date:    10/27/2022  Time:    14:21                Narrative:    EXAMINATION:  CT ABDOMEN PELVIS WITH CONTRAST    CLINICAL HISTORY:  Abdominal abscess/infection suspected;colitis;    TECHNIQUE:  Low dose axial images, sagittal and coronal reformations were obtained from the lung bases to the pubic symphysis following the IV administration of 100 mL of Omnipaque 350.  Oral contrast was not administered.    COMPARISON:  None    FINDINGS:  Heart: Normal size. No effusion.    Lung Bases: Clear.    Liver: Normal size and attenuation. No focal lesions.    Gallbladder: No calcified gallstones.    Bile Ducts: No dilatation.    Pancreas: No mass. No peripancreatic fat stranding.    Spleen: Normal.    Adrenals: Normal.    Kidneys/Ureters: Normal enhancement.  Scarring seen throughout portions of the right kidney.  No mass or  hydroureteronephrosis.    Bladder: No wall thickening.    Reproductive organs: Right ovarian/adnexal cysts versus hydrosalpinx suspected.    GI Tract/Mesentery: No evidence of bowel obstruction..  No evidence of appendicitis.  Large bowel is collapsed which limits evaluation.  Mild thickening of the transverse and descending colon which could reflect mild infectious or inflammatory colitis.    Peritoneal Space: No ascites or free air.    Retroperitoneum: No significant adenopathy.    Abdominal wall: Small fat containing umbilical hernia.    Vasculature: No aneurysm. Aorta demonstrates atherosclerotic disease.    Bones: No acute fracture.  Moderate degenerative changes including facet arthropathy throughout the lower lumbar spine.  Remote left lateral transverse process fractures at L4 and L5.  No suspicious lytic or sclerotic lesions.                                       X-Ray Chest AP Portable (Final result)  Result time 10/27/22 12:43:43      Final result by Joseluis Nj MD (10/27/22 12:43:43)                   Impression:      No acute abnormality.      Electronically signed by: Joseluis Nj  Date:    10/27/2022  Time:    12:43                Narrative:    EXAMINATION:  XR CHEST AP PORTABLE    CLINICAL HISTORY:  Chest pain;.    TECHNIQUE:  Single frontal portable view of the chest was performed.    COMPARISON:  None    FINDINGS:  Support devices: None    Calcified right hilar lymph node.The lungs are clear, with normal appearance of pulmonary vasculature and no pleural effusion or pneumothorax.    The cardiac silhouette is normal in size. The hilar and mediastinal contours are unremarkable.    Bones are intact.

## 2022-10-27 NOTE — ASSESSMENT & PLAN NOTE
Pt meets two SIRS criteria HR>90, RR >20     This patient does have evidence of infective focus  My overall impression is sepsis. Vital signs were reviewed and noted in progress note.  Antibiotics given-   Antibiotics (From admission, onward)    Start     Stop Route Frequency Ordered    10/27/22 2200  metroNIDAZOLE tablet 500 mg         -- Oral Every 8 hours 10/27/22 1454    10/27/22 1600  ciprofloxacin (CIPRO)400mg/200ml D5W IVPB 400 mg         -- IV Every 12 hours (non-standard times) 10/27/22 1454        Cultures were taken-   Microbiology Results (last 7 days)     Procedure Component Value Units Date/Time    Stool culture [[id number]]     Order Status: No result Specimen: Stool     Clostridium difficile EIA [[id number]]     Order Status: No result Specimen: Stool     Blood Culture #2 **CANNOT BE ORDERED STAT** [[id number]] Collected: 10/27/22 1301    Order Status: Sent Specimen: Blood Updated: 10/27/22 1302    Blood Culture #1 **CANNOT BE ORDERED STAT** [[id number]] Collected: 10/27/22 1252    Order Status: Sent Specimen: Blood from Peripheral, Antecubital, Left Updated: 10/27/22 1253        Latest lactate reviewed, they are-  Recent Labs   Lab 10/27/22  1238   LACTATE 5.8*       Organ dysfunction indicated by none  Source- GI tract , Respiratory tract     Source control Achieved by-     Antiviral   Empiric antibacterial targeting intra abdominal infection

## 2022-10-27 NOTE — PHARMACY MED REC
"Admission Medication History     The home medication history was taken by Jony Lennon.    You may go to "Admission" then "Reconcile Home Medications" tabs to review and/or act upon these items.     The home medication list has been updated by the Pharmacy department.   Please read ALL comments highlighted in yellow.   Please address this information as you see fit.    Feel free to contact us if you have any questions or require assistance.      The medications listed below were removed from the home medication list. Please reorder if appropriate:  Patient reports no longer taking the following medication(s):  NAPROXEN 500MG  ZOFRAN 4MG      Medications listed below were obtained from: Analytic software- [employer] and Medical records  (Not in a hospital admission)        Jony Lennon  [id number]    Current Outpatient Medications on File Prior to Encounter   Medication Sig Dispense Refill Last Dose    albuterol (PROVENTIL/VENTOLIN HFA) 90 mcg/actuation inhaler Inhale 1-2 puffs into the lungs every 6 (six) hours as needed for Wheezing. Rescue 1 Inhaler 0     ciprofloxacin HCl (CIPRO) 500 MG tablet Take 500 mg by mouth every 12 (twelve) hours.       methylPREDNISolone (MEDROL) 4 MG Tab Take 4 mg by mouth once daily.       metroNIDAZOLE (FLAGYL) 500 MG tablet Take 500 mg by mouth every 8 (eight) hours.                              .        "

## 2022-10-27 NOTE — Clinical Note
Diagnosis: SOB (shortness of breath) [882136]   Future Attending Provider: SHAILESH MACHADO [[id number]]   Admitting Provider:: SHAILESH MACHADO [[id number]]   Special Needs:: No Special Needs [1]

## 2022-10-27 NOTE — ED PROVIDER NOTES
SCRIBE #1 NOTE: I, Efrain Cuellar, am scribing for, and in the presence of,  Celi Yoder DO and Malick Medina MD. I have scribed the entire note.       History     Chief Complaint   Patient presents with    Shortness of Breath     Wheezing en route     Review of patient's allergies indicates:  No Known Allergies      History of Present Illness     HPI    10/27/2022, 12:18 PM  History obtained from the patient      History of Present Illness: Lucero Townsend is a 61 y.o. female patient with a PMHx of asthma who presents to the Emergency Department for evaluation of SOB x 2 days. Pt was dx with acute colitis at [facility] and recently d/c. CT scan showed 5cm right adnexal mass on the 10/25. Symptoms are constant and moderate in severity. No mitigating or exacerbating factors reported. Associated sxs include fever, cough, wheezing, abd pain, and diarrhea. Patient denies any n/v, dysuria, frequency, CP, HA, and all other sxs at this time. No prior tx reported. No further complaints or concerns at this time.       Arrival mode: Ambulance service    PCP: Primary Doctor No        Past Medical History:  Past Medical History:   Diagnosis Date    Asthma        Past Surgical History:  History reviewed. No pertinent surgical history.      Family History:  Family History   Problem Relation Age of Onset    Cancer Mother     Diabetes Mother     Diabetes Father        Social History:  Social History     Tobacco Use    Smoking status: Every Day     Types: Cigarettes    Smokeless tobacco: Never   Substance and Sexual Activity    Alcohol use: Not on file     Comment: occ    Drug use: Never    Sexual activity: Not on file        Review of Systems     Review of Systems   Constitutional:  Positive for fever.   HENT:  Negative for sore throat.    Respiratory:  Positive for cough, shortness of breath and wheezing.    Cardiovascular:  Negative for chest pain.   Gastrointestinal:  Positive for abdominal pain. Negative for diarrhea, nausea and  vomiting.   Genitourinary:  Negative for dysuria.   Musculoskeletal:  Negative for back pain.   Skin:  Negative for rash.   Neurological:  Negative for weakness.   Hematological:  Does not bruise/bleed easily.   All other systems reviewed and are negative.   Physical Exam     Initial Vitals   BP Pulse Resp Temp SpO2   10/27/22 1137 10/27/22 1137 10/27/22 1137 10/27/22 1144 10/27/22 1137   (!) 140/90 93 (!) 30 98.9 °F (37.2 °C) 99 %      MAP       --                 Physical Exam  Nursing Notes and Vital Signs Reviewed.  Constitutional: Patient is in mild distress. Well-developed and well-nourished.  Head: Atraumatic. Normocephalic.  Eyes: PERRL. EOM intact. Conjunctivae are not pale. No scleral icterus.  ENT: Mucous membranes are moist. Oropharynx is clear and symmetric.    Neck: Supple. Full ROM. No lymphadenopathy.  Cardiovascular: Regular rate. Regular rhythm. No murmurs, rubs, or gallops. Distal pulses are 2+ and symmetric.  Pulmonary/Chest: No respiratory distress. Clear to auscultation bilaterally. No wheezing or rales.  Abdominal: Soft and non-distended.  There is diffuse tenderness.  No rebound, guarding, or rigidity. Good bowel sounds.  Genitourinary: No CVA tenderness  Musculoskeletal: Moves all extremities. No obvious deformities. No edema. No calf tenderness.  Skin: Warm and dry.  Neurological:  Alert, awake, and appropriate.  Normal speech.  No acute focal neurological deficits are appreciated.  Psychiatric: Normal affect. Good eye contact. Appropriate in content.     ED Course   Procedures  ED Vital Signs:  Vitals:    10/27/22 1833 10/27/22 1913 10/27/22 2124 10/27/22 2243   BP: (!) 179/90  (!) 174/77 (!) 147/67   Pulse: 87 87 85 79   Resp: (!) 30 (!) 22     Temp: 99.2 °F (37.3 °C)      TempSrc: Oral      SpO2: 96% 97% 97%    Weight:       Height:        10/27/22 2353 10/28/22 0055 10/28/22 0117 10/28/22 0241   BP:    (!) 170/77   Pulse: 74 79 75 75   Resp:  18  16   Temp:    99.3 °F (37.4 °C)    TempSrc:    Oral   SpO2:  97%  95%   Weight:       Height:        10/28/22 0342 10/28/22 0534 10/28/22 0715 10/28/22 0806   BP: (!) 161/81      Pulse: 73 78 68 70   Resp: (!) 22   18   Temp: 98.7 °F (37.1 °C)      TempSrc: Oral      SpO2: 97%   97%   Weight:       Height:        10/28/22 0845 10/28/22 0847 10/28/22 0900   BP: (!) 177/98 (!) 154/74    Pulse: 83 76 99   Resp: (!) 30     Temp: 99.1 °F (37.3 °C)     TempSrc: Oral     SpO2: 95% 95%    Weight:      Height:          Abnormal Lab Results:  Labs Reviewed   CBC W/ AUTO DIFFERENTIAL - Abnormal; Notable for the following components:       Result Value    RBC 3.13 (*)     Hemoglobin 11.3 (*)     Hematocrit 32.7 (*)      (*)     MCH 36.1 (*)     RDW 17.1 (*)     MPV 9.1 (*)     Immature Granulocytes 0.7 (*)     Gran # (ANC) 8.0 (*)     Immature Grans (Abs) 0.06 (*)     Lymph # 0.8 (*)     Mono # 0.2 (*)     Gran % 88.4 (*)     Lymph % 8.2 (*)     Mono % 2.5 (*)     All other components within normal limits   COMPREHENSIVE METABOLIC PANEL - Abnormal; Notable for the following components:    Potassium 2.3 (*)     CO2 17 (*)     Glucose 139 (*)     Calcium 8.0 (*)     Anion Gap 21 (*)     All other components within normal limits    Narrative:      K  critical result(s) called and verbal readback obtained from   DARIEN NIETO by ARTEMIO 10/27/2022 13:14   LACTIC ACID, PLASMA - Abnormal; Notable for the following components:    Lactate (Lactic Acid) 5.8 (*)     All other components within normal limits    Narrative:        LA critical result(s) called and verbal readback obtained from   DARIEN NIETO by ARTEMIO 10/27/2022 13:14   POCT INFLUENZA A/B MOLECULAR - Abnormal; Notable for the following components:    POC Molecular Influenza A Ag Positive (*)     All other components within normal limits   SARS-COV-2 RDRP GENE - Normal   TROPONIN I   B-TYPE NATRIURETIC PEPTIDE   PROTIME-INR   LIPASE   PROCALCITONIN   ALCOHOL,MEDICAL (ETHANOL)   IRON AND TIBC   FERRITIN   VITAMIN  B12   FOLATE        All Lab Results:  Results for orders placed or performed during the hospital encounter of 10/27/22   Blood Culture #1 **CANNOT BE ORDERED STAT**    Specimen: Peripheral, Antecubital, Left; Blood   Result Value Ref Range    Blood Culture, Routine No Growth to date    Blood Culture #2 **CANNOT BE ORDERED STAT**    Specimen: Blood   Result Value Ref Range    Blood Culture, Routine No Growth to date    Clostridium difficile EIA    Specimen: Stool   Result Value Ref Range    C. diff Antigen Negative Negative    C difficile Toxins A+B, EIA Negative Negative   CBC auto differential   Result Value Ref Range    WBC 9.10 3.90 - 12.70 K/uL    RBC 3.13 (L) 4.00 - 5.40 M/uL    Hemoglobin 11.3 (L) 12.0 - 16.0 g/dL    Hematocrit 32.7 (L) 37.0 - 48.5 %     (H) 82 - 98 fL    MCH 36.1 (H) 27.0 - 31.0 pg    MCHC 34.6 32.0 - 36.0 g/dL    RDW 17.1 (H) 11.5 - 14.5 %    Platelets 250 150 - 450 K/uL    MPV 9.1 (L) 9.2 - 12.9 fL    Immature Granulocytes 0.7 (H) 0.0 - 0.5 %    Gran # (ANC) 8.0 (H) 1.8 - 7.7 K/uL    Immature Grans (Abs) 0.06 (H) 0.00 - 0.04 K/uL    Lymph # 0.8 (L) 1.0 - 4.8 K/uL    Mono # 0.2 (L) 0.3 - 1.0 K/uL    Eos # 0.0 0.0 - 0.5 K/uL    Baso # 0.02 0.00 - 0.20 K/uL    nRBC 0 0 /100 WBC    Gran % 88.4 (H) 38.0 - 73.0 %    Lymph % 8.2 (L) 18.0 - 48.0 %    Mono % 2.5 (L) 4.0 - 15.0 %    Eosinophil % 0.0 0.0 - 8.0 %    Basophil % 0.2 0.0 - 1.9 %    Differential Method Automated    Comprehensive metabolic panel   Result Value Ref Range    Sodium 142 136 - 145 mmol/L    Potassium 2.3 (LL) 3.5 - 5.1 mmol/L    Chloride 104 95 - 110 mmol/L    CO2 17 (L) 23 - 29 mmol/L    Glucose 139 (H) 70 - 110 mg/dL    BUN 7 6 - 20 mg/dL    Creatinine 0.9 0.5 - 1.4 mg/dL    Calcium 8.0 (L) 8.7 - 10.5 mg/dL    Total Protein 6.8 6.0 - 8.4 g/dL    Albumin 4.0 3.5 - 5.2 g/dL    Total Bilirubin 0.4 0.1 - 1.0 mg/dL    Alkaline Phosphatase 69 55 - 135 U/L    AST 25 10 - 40 U/L    ALT 10 10 - 44 U/L    Anion Gap 21 (H) 8 - 16  mmol/L    eGFR >60 >60 mL/min/1.73 m^2   Troponin I   Result Value Ref Range    Troponin I 0.009 0.000 - 0.026 ng/mL   Brain natriuretic peptide   Result Value Ref Range    BNP 60 0 - 99 pg/mL   Protime-INR   Result Value Ref Range    Prothrombin Time 11.9 9.0 - 12.5 sec    INR 1.1 0.8 - 1.2   Lipase   Result Value Ref Range    Lipase 30 4 - 60 U/L   Lactic acid, plasma   Result Value Ref Range    Lactate (Lactic Acid) 5.8 (HH) 0.5 - 2.2 mmol/L   Procalcitonin   Result Value Ref Range    Procalcitonin 0.05 <0.25 ng/mL   Lactic acid, plasma #2   Result Value Ref Range    Lactate (Lactic Acid) 9.6 (HH) 0.5 - 2.2 mmol/L   Rotavirus antigen, stool   Result Value Ref Range    Rotavirus Negative Negative   CK   Result Value Ref Range     (H) 20 - 180 U/L   Lactic acid, plasma   Result Value Ref Range    Lactate (Lactic Acid) 4.9 (HH) 0.5 - 2.2 mmol/L   Hemoglobin   Result Value Ref Range    Hemoglobin 9.6 (L) 12.0 - 16.0 g/dL   Hematocrit   Result Value Ref Range    Hematocrit 27.7 (L) 37.0 - 48.5 %   Magnesium   Result Value Ref Range    Magnesium 0.8 (LL) 1.6 - 2.6 mg/dL   Comprehensive Metabolic Panel   Result Value Ref Range    Sodium 139 136 - 145 mmol/L    Potassium 2.5 (LL) 3.5 - 5.1 mmol/L    Chloride 104 95 - 110 mmol/L    CO2 19 (L) 23 - 29 mmol/L    Glucose 158 (H) 70 - 110 mg/dL    BUN 6 6 - 20 mg/dL    Creatinine 0.8 0.5 - 1.4 mg/dL    Calcium 7.4 (L) 8.7 - 10.5 mg/dL    Total Protein 6.1 6.0 - 8.4 g/dL    Albumin 3.4 (L) 3.5 - 5.2 g/dL    Total Bilirubin 0.4 0.1 - 1.0 mg/dL    Alkaline Phosphatase 57 55 - 135 U/L    AST 19 10 - 40 U/L    ALT 9 (L) 10 - 44 U/L    Anion Gap 16 8 - 16 mmol/L    eGFR >60 >60 mL/min/1.73 m^2   Comprehensive metabolic panel   Result Value Ref Range    Sodium 139 136 - 145 mmol/L    Potassium 2.6 (LL) 3.5 - 5.1 mmol/L    Chloride 103 95 - 110 mmol/L    CO2 22 (L) 23 - 29 mmol/L    Glucose 184 (H) 70 - 110 mg/dL    BUN 6 6 - 20 mg/dL    Creatinine 0.8 0.5 - 1.4 mg/dL     Calcium 7.2 (L) 8.7 - 10.5 mg/dL    Total Protein 5.7 (L) 6.0 - 8.4 g/dL    Albumin 3.2 (L) 3.5 - 5.2 g/dL    Total Bilirubin 0.5 0.1 - 1.0 mg/dL    Alkaline Phosphatase 59 55 - 135 U/L    AST 17 10 - 40 U/L    ALT 9 (L) 10 - 44 U/L    Anion Gap 14 8 - 16 mmol/L    eGFR >60 >60 mL/min/1.73 m^2   Magnesium   Result Value Ref Range    Magnesium 1.0 (L) 1.6 - 2.6 mg/dL   Phosphorus   Result Value Ref Range    Phosphorus 2.5 (L) 2.7 - 4.5 mg/dL   CBC auto differential   Result Value Ref Range    WBC 6.32 3.90 - 12.70 K/uL    RBC 2.56 (L) 4.00 - 5.40 M/uL    Hemoglobin 9.2 (L) 12.0 - 16.0 g/dL    Hematocrit 26.6 (L) 37.0 - 48.5 %     (H) 82 - 98 fL    MCH 35.9 (H) 27.0 - 31.0 pg    MCHC 34.6 32.0 - 36.0 g/dL    RDW 16.9 (H) 11.5 - 14.5 %    Platelets 218 150 - 450 K/uL    MPV 9.3 9.2 - 12.9 fL    Immature Granulocytes 1.1 (H) 0.0 - 0.5 %    Gran # (ANC) 5.4 1.8 - 7.7 K/uL    Immature Grans (Abs) 0.07 (H) 0.00 - 0.04 K/uL    Lymph # 0.6 (L) 1.0 - 4.8 K/uL    Mono # 0.2 (L) 0.3 - 1.0 K/uL    Eos # 0.0 0.0 - 0.5 K/uL    Baso # 0.01 0.00 - 0.20 K/uL    nRBC 1 (A) 0 /100 WBC    Gran % 86.1 (H) 38.0 - 73.0 %    Lymph % 9.8 (L) 18.0 - 48.0 %    Mono % 2.8 (L) 4.0 - 15.0 %    Eosinophil % 0.0 0.0 - 8.0 %    Basophil % 0.2 0.0 - 1.9 %    Differential Method Automated    Lactic acid, plasma   Result Value Ref Range    Lactate (Lactic Acid) 3.9 (HH) 0.5 - 2.2 mmol/L   C-reactive protein   Result Value Ref Range    CRP 3.0 0.0 - 8.2 mg/L   Sedimentation rate   Result Value Ref Range    Sed Rate 5 0 - 20 mm/Hr   POCT COVID-19 Rapid Screening   Result Value Ref Range    POC Rapid COVID Negative Negative     Acceptable Yes    POCT Influenza A/B Molecular   Result Value Ref Range    POC Molecular Influenza A Ag Positive (A) Negative, Not Reported    POC Molecular Influenza B Ag Negative Negative, Not Reported     Acceptable Yes        Imaging Results:  Imaging Results              CT Abdomen Pelvis  With Contrast (Final result)  Result time 10/27/22 14:21:18      Final result by Magdiel Richardson MD (10/27/22 14:21:18)                   Impression:      Mild thickening of the transverse and descending colon which could reflect mild infectious or inflammatory colitis.  No abdominal abscess identified    All CT scans at this facility use dose modulation, iterative reconstruction, and/or weight based dosing when appropriate to reduce radiation dose to as low as reasonable achievable.      Electronically signed by: Magdiel Richardson MD  Date:    10/27/2022  Time:    14:21               Narrative:    EXAMINATION:  CT ABDOMEN PELVIS WITH CONTRAST    CLINICAL HISTORY:  Abdominal abscess/infection suspected;colitis;    TECHNIQUE:  Low dose axial images, sagittal and coronal reformations were obtained from the lung bases to the pubic symphysis following the IV administration of 100 mL of Omnipaque 350.  Oral contrast was not administered.    COMPARISON:  None    FINDINGS:  Heart: Normal size. No effusion.    Lung Bases: Clear.    Liver: Normal size and attenuation. No focal lesions.    Gallbladder: No calcified gallstones.    Bile Ducts: No dilatation.    Pancreas: No mass. No peripancreatic fat stranding.    Spleen: Normal.    Adrenals: Normal.    Kidneys/Ureters: Normal enhancement.  Scarring seen throughout portions of the right kidney.  No mass or  hydroureteronephrosis.    Bladder: No wall thickening.    Reproductive organs: Right ovarian/adnexal cysts versus hydrosalpinx suspected.    GI Tract/Mesentery: No evidence of bowel obstruction..  No evidence of appendicitis.  Large bowel is collapsed which limits evaluation.  Mild thickening of the transverse and descending colon which could reflect mild infectious or inflammatory colitis.    Peritoneal Space: No ascites or free air.    Retroperitoneum: No significant adenopathy.    Abdominal wall: Small fat containing umbilical hernia.    Vasculature: No aneurysm. Aorta  demonstrates atherosclerotic disease.    Bones: No acute fracture.  Moderate degenerative changes including facet arthropathy throughout the lower lumbar spine.  Remote left lateral transverse process fractures at L4 and L5.  No suspicious lytic or sclerotic lesions.                                       X-Ray Chest AP Portable (Final result)  Result time 10/27/22 12:43:43      Final result by Joseluis Nj MD (10/27/22 12:43:43)                   Impression:      No acute abnormality.      Electronically signed by: Joseluis Nj  Date:    10/27/2022  Time:    12:43               Narrative:    EXAMINATION:  XR CHEST AP PORTABLE    CLINICAL HISTORY:  Chest pain;.    TECHNIQUE:  Single frontal portable view of the chest was performed.    COMPARISON:  None    FINDINGS:  Support devices: None    Calcified right hilar lymph node.The lungs are clear, with normal appearance of pulmonary vasculature and no pleural effusion or pneumothorax.    The cardiac silhouette is normal in size. The hilar and mediastinal contours are unremarkable.    Bones are intact.                                       The EKG was ordered, reviewed, and independently interpreted by the ED provider.  Interpretation time: 11:52  Rate: 104 BPM  Rhythm: sinus tachycardia  Interpretation: Incomplete RBBB. Left anterior fascicular block. ST and T wave abnormality, consider lateral ischemia. No STEMI.             The Emergency Provider reviewed the vital signs and test results, which are outlined above.     ED Discussion     1:00 PM: Dr. Yoder transfers care to Dr. Medina    3:23 PM: Discussed case with Ranjeet Bejarano NP (Hospital Medicine). Dr. Caban agrees with current care and management of pt and accepts admission.   Admitting Service: Hospital medicine  Admitting Physician: Dr. Caban  Admit to: OBS med tele    3:23 PM: Re-evaluated pt. I have discussed test results, shared treatment plan, and the need for admission with patient and family at  bedside. Pt and family express understanding at this time and agree with all information. All questions answered. Pt and family have no further questions or concerns at this time. Pt is ready for admit.    ABX ordered due to colitis/elevated lactate/possible sepsis.  Pt is very comfortable with the t     Medical Decision Making:   Clinical Tests:   Lab Tests: Ordered and Reviewed  Radiological Study: Reviewed and Ordered  Medical Tests: Ordered and Reviewed         ED Medication(s):  Medications   potassium chloride 10 mEq in 100 mL IVPB (10 mEq Intravenous New Bag 10/27/22 1836)   metroNIDAZOLE tablet 500 mg (500 mg Oral Given 10/28/22 0530)   ciprofloxacin (CIPRO)400mg/200ml D5W IVPB 400 mg (0 mg Intravenous Stopped 10/28/22 0439)   sodium chloride 0.9% flush 10 mL (has no administration in time range)   acetaminophen tablet 650 mg (has no administration in time range)   HYDROcodone-acetaminophen 5-325 mg per tablet 1 tablet (has no administration in time range)   HYDROcodone-acetaminophen  mg per tablet 1 tablet (1 tablet Oral Given 10/27/22 1723)   ondansetron injection 4 mg (has no administration in time range)   hyoscyamine ODT 0.125 mg (has no administration in time range)   hydrALAZINE injection 10 mg (10 mg Intravenous Given 10/27/22 1715)   methylPREDNISolone sodium succinate injection 40 mg (40 mg Intravenous Given 10/28/22 0550)   albuterol-ipratropium 2.5 mg-0.5 mg/3 mL nebulizer solution 3 mL (3 mLs Nebulization Given 10/28/22 0806)   budesonide nebulizer solution 0.5 mg (0.5 mg Nebulization Given 10/28/22 0806)   oseltamivir capsule 75 mg (75 mg Oral Given 10/28/22 0846)   0.45 % NaCl with KCl 20 mEq infusion ( Intravenous New Bag 10/28/22 0848)   sodium bicarbonate tablet 650 mg (650 mg Oral Given 10/28/22 0846)   melatonin tablet 6 mg (has no administration in time range)   amLODIPine tablet 5 mg (5 mg Oral Given 10/28/22 0846)   cetirizine tablet 10 mg (10 mg Oral Given 10/27/22 2040)    pantoprazole injection 40 mg (40 mg Intravenous Given 10/28/22 0846)   methylPREDNISolone sodium succinate injection 125 mg (125 mg Intravenous Given 10/27/22 1307)   albuterol-ipratropium 2.5 mg-0.5 mg/3 mL nebulizer solution 3 mL (3 mLs Nebulization Given 10/27/22 1240)   iohexoL (OMNIPAQUE 350) injection 100 mL (100 mLs Intravenous Given 10/27/22 1352)   lactated ringers bolus 3,039 mL (0 mLs Intravenous Stopped 10/27/22 1623)   morphine injection 4 mg (4 mg Intravenous Given 10/27/22 1503)   ondansetron injection 4 mg (4 mg Intravenous Given 10/27/22 1504)   sodium chloride 0.9% bolus 1,000 mL (0 mLs Intravenous Stopped 10/27/22 2249)   pantoprazole injection 80 mg (80 mg Intravenous Given 10/28/22 0022)   magnesium sulfate 2g in water 50mL IVPB (premix) (0 g Intravenous Stopped 10/28/22 0730)   potassium bicarbonate disintegrating tablet 50 mEq (50 mEq Oral Given 10/28/22 0900)       Current Discharge Medication List                  Scribe Attestation:   Scribe #1: I performed the above scribed service and the documentation accurately describes the services I performed. I attest to the accuracy of the note.     Attending:   Physician Attestation Statement for Scribe #1: I, Celi Yoder DO, personally performed the services described in this documentation, as scribed by Efrain Cuellar, in my presence, and it is both accurate and complete.           Clinical Impression       ICD-10-CM ICD-9-CM   1. Colitis  K52.9 558.9   2. SOB (shortness of breath)  R06.02 786.05   3. Hypokalemia  E87.6 276.8   4. Influenza A infection   J10.1 487.1   5. Generalized abdominal pain  R10.84 789.07   6. Elevated lactic acid level  R79.89 276.2       Disposition:   Disposition: Placed in Observation  Condition: Daniel Medina MD  10/28/22 8941

## 2022-10-27 NOTE — ASSESSMENT & PLAN NOTE
- Likely viral. Get stool study   -Empiric antibiotic treatment targeting intra abdominal infection with Cipro and Flagyl   -Fluid resuscitation   -Electrolyte replacement   -Other supportive treatment

## 2022-10-27 NOTE — HPI
The pt is a 62 yo female with PMHx significant for Asthma and tobacco abuse presented to the ED for evaluation of 4 days h/o cough, chest congestion,SOB, wheezing,  fever, chills, abdominal pain and cramp across the lower abd , nausea , vomiting and multiple episodes of diarrhea throughout the day with dark, loose stools. Denies chest pain, Pt was seen at [facility] 10/25/22 for similar symptoms and diagnosed with colitis and asthma  and was discharged  home on oral Cipro /Flagyl and medrol dosepak. Pt returns here today and reports symptoms did not improve and now can't keep food down due to vomiting and still having diarrhea. Additionally pt is noted to have positive Influenza A. Unsure of sick contact and no recent travel. She is a current everyday smoker , denies drinking alcohol. Vitals on presentation - 140/90, P-93, RR-30, SpO2 99% on 3 L, T-98.9. Labs - WBC 9.1, K 2.3, CO2 17, Cr 0.9, Lipase normal, LA 5.8, BNP 60, Troponin 0.009, PCT 0.05, CXR - clear lungs, CT abd/pelvis - mild thickening of the transverse and descending colon suggestive of colitis. Pt received 30ml/kg bolus fluid, Cipro, Flagyl , Solumedrol 125 mg in the ED and HM consulted for admission.     Pt will be placed in observation under HM service for sepsis due to colitis and influenza, Severe hypokalemia due to gastroenteritis and vomiting, and Metabolic acidosis/ Lactic acidosis.

## 2022-10-28 PROBLEM — R19.7 DIARRHEA OF PRESUMED INFECTIOUS ORIGIN: Status: ACTIVE | Noted: 2022-10-28

## 2022-10-28 LAB
ALBUMIN SERPL BCP-MCNC: 3.2 G/DL (ref 3.5–5.2)
ALBUMIN SERPL BCP-MCNC: 3.4 G/DL (ref 3.5–5.2)
ALP SERPL-CCNC: 57 U/L (ref 55–135)
ALP SERPL-CCNC: 59 U/L (ref 55–135)
ALT SERPL W/O P-5'-P-CCNC: 9 U/L (ref 10–44)
ALT SERPL W/O P-5'-P-CCNC: 9 U/L (ref 10–44)
ANION GAP SERPL CALC-SCNC: 14 MMOL/L (ref 8–16)
ANION GAP SERPL CALC-SCNC: 16 MMOL/L (ref 8–16)
AST SERPL-CCNC: 17 U/L (ref 10–40)
AST SERPL-CCNC: 19 U/L (ref 10–40)
BASOPHILS # BLD AUTO: 0 K/UL (ref 0–0.2)
BASOPHILS # BLD AUTO: 0.01 K/UL (ref 0–0.2)
BASOPHILS NFR BLD: 0 % (ref 0–1.9)
BASOPHILS NFR BLD: 0.2 % (ref 0–1.9)
BILIRUB SERPL-MCNC: 0.4 MG/DL (ref 0.1–1)
BILIRUB SERPL-MCNC: 0.5 MG/DL (ref 0.1–1)
BUN SERPL-MCNC: 6 MG/DL (ref 6–20)
BUN SERPL-MCNC: 6 MG/DL (ref 6–20)
C DIFF GDH STL QL: NEGATIVE
C DIFF TOX A+B STL QL IA: NEGATIVE
CALCIUM SERPL-MCNC: 7.2 MG/DL (ref 8.7–10.5)
CALCIUM SERPL-MCNC: 7.4 MG/DL (ref 8.7–10.5)
CHLORIDE SERPL-SCNC: 103 MMOL/L (ref 95–110)
CHLORIDE SERPL-SCNC: 104 MMOL/L (ref 95–110)
CO2 SERPL-SCNC: 19 MMOL/L (ref 23–29)
CO2 SERPL-SCNC: 22 MMOL/L (ref 23–29)
CREAT SERPL-MCNC: 0.8 MG/DL (ref 0.5–1.4)
CREAT SERPL-MCNC: 0.8 MG/DL (ref 0.5–1.4)
CRP SERPL-MCNC: 3 MG/L (ref 0–8.2)
DIFFERENTIAL METHOD: ABNORMAL
DIFFERENTIAL METHOD: ABNORMAL
EOSINOPHIL # BLD AUTO: 0 K/UL (ref 0–0.5)
EOSINOPHIL # BLD AUTO: 0 K/UL (ref 0–0.5)
EOSINOPHIL NFR BLD: 0 % (ref 0–8)
EOSINOPHIL NFR BLD: 0 % (ref 0–8)
ERYTHROCYTE [DISTWIDTH] IN BLOOD BY AUTOMATED COUNT: 16.9 % (ref 11.5–14.5)
ERYTHROCYTE [DISTWIDTH] IN BLOOD BY AUTOMATED COUNT: 17 % (ref 11.5–14.5)
ERYTHROCYTE [SEDIMENTATION RATE] IN BLOOD BY WESTERGREN METHOD: 5 MM/HR (ref 0–20)
EST. GFR  (NO RACE VARIABLE): >60 ML/MIN/1.73 M^2
EST. GFR  (NO RACE VARIABLE): >60 ML/MIN/1.73 M^2
GLUCOSE SERPL-MCNC: 158 MG/DL (ref 70–110)
GLUCOSE SERPL-MCNC: 184 MG/DL (ref 70–110)
HCT VFR BLD AUTO: 26.6 % (ref 37–48.5)
HCT VFR BLD AUTO: 27.7 % (ref 37–48.5)
HCT VFR BLD AUTO: 32.4 % (ref 37–48.5)
HGB BLD-MCNC: 11.2 G/DL (ref 12–16)
HGB BLD-MCNC: 9.2 G/DL (ref 12–16)
HGB BLD-MCNC: 9.6 G/DL (ref 12–16)
IMM GRANULOCYTES # BLD AUTO: 0.07 K/UL (ref 0–0.04)
IMM GRANULOCYTES # BLD AUTO: 0.12 K/UL (ref 0–0.04)
IMM GRANULOCYTES NFR BLD AUTO: 1.1 % (ref 0–0.5)
IMM GRANULOCYTES NFR BLD AUTO: 2.3 % (ref 0–0.5)
LACTATE SERPL-SCNC: 3.9 MMOL/L (ref 0.5–2.2)
LACTATE SERPL-SCNC: 4.9 MMOL/L (ref 0.5–2.2)
LYMPHOCYTES # BLD AUTO: 0.4 K/UL (ref 1–4.8)
LYMPHOCYTES # BLD AUTO: 0.6 K/UL (ref 1–4.8)
LYMPHOCYTES NFR BLD: 8.4 % (ref 18–48)
LYMPHOCYTES NFR BLD: 9.8 % (ref 18–48)
MAGNESIUM SERPL-MCNC: 0.8 MG/DL (ref 1.6–2.6)
MAGNESIUM SERPL-MCNC: 1 MG/DL (ref 1.6–2.6)
MCH RBC QN AUTO: 35.9 PG (ref 27–31)
MCH RBC QN AUTO: 35.9 PG (ref 27–31)
MCHC RBC AUTO-ENTMCNC: 34.6 G/DL (ref 32–36)
MCHC RBC AUTO-ENTMCNC: 34.6 G/DL (ref 32–36)
MCV RBC AUTO: 104 FL (ref 82–98)
MCV RBC AUTO: 104 FL (ref 82–98)
MONOCYTES # BLD AUTO: 0.2 K/UL (ref 0.3–1)
MONOCYTES # BLD AUTO: 0.3 K/UL (ref 0.3–1)
MONOCYTES NFR BLD: 2.8 % (ref 4–15)
MONOCYTES NFR BLD: 5 % (ref 4–15)
NEUTROPHILS # BLD AUTO: 4.4 K/UL (ref 1.8–7.7)
NEUTROPHILS # BLD AUTO: 5.4 K/UL (ref 1.8–7.7)
NEUTROPHILS NFR BLD: 84.3 % (ref 38–73)
NEUTROPHILS NFR BLD: 86.1 % (ref 38–73)
NRBC BLD-RTO: 1 /100 WBC
NRBC BLD-RTO: 2 /100 WBC
PHOSPHATE SERPL-MCNC: 2.5 MG/DL (ref 2.7–4.5)
PLATELET # BLD AUTO: 218 K/UL (ref 150–450)
PLATELET # BLD AUTO: 238 K/UL (ref 150–450)
PMV BLD AUTO: 9 FL (ref 9.2–12.9)
PMV BLD AUTO: 9.3 FL (ref 9.2–12.9)
POTASSIUM SERPL-SCNC: 2.5 MMOL/L (ref 3.5–5.1)
POTASSIUM SERPL-SCNC: 2.6 MMOL/L (ref 3.5–5.1)
PROT SERPL-MCNC: 5.7 G/DL (ref 6–8.4)
PROT SERPL-MCNC: 6.1 G/DL (ref 6–8.4)
RBC # BLD AUTO: 2.56 M/UL (ref 4–5.4)
RBC # BLD AUTO: 3.12 M/UL (ref 4–5.4)
RV AG STL QL IA.RAPID: NEGATIVE
SODIUM SERPL-SCNC: 139 MMOL/L (ref 136–145)
SODIUM SERPL-SCNC: 139 MMOL/L (ref 136–145)
WBC # BLD AUTO: 5.24 K/UL (ref 3.9–12.7)
WBC # BLD AUTO: 6.32 K/UL (ref 3.9–12.7)

## 2022-10-28 PROCEDURE — 25000242 PHARM REV CODE 250 ALT 637 W/ HCPCS: Performed by: INTERNAL MEDICINE

## 2022-10-28 PROCEDURE — 83735 ASSAY OF MAGNESIUM: CPT | Mod: 91 | Performed by: NURSE PRACTITIONER

## 2022-10-28 PROCEDURE — 87209 SMEAR COMPLEX STAIN: CPT | Performed by: INTERNAL MEDICINE

## 2022-10-28 PROCEDURE — 85651 RBC SED RATE NONAUTOMATED: CPT | Performed by: PHYSICIAN ASSISTANT

## 2022-10-28 PROCEDURE — 85025 COMPLETE CBC W/AUTO DIFF WBC: CPT | Performed by: INTERNAL MEDICINE

## 2022-10-28 PROCEDURE — 96366 THER/PROPH/DIAG IV INF ADDON: CPT

## 2022-10-28 PROCEDURE — 36415 COLL VENOUS BLD VENIPUNCTURE: CPT | Performed by: INTERNAL MEDICINE

## 2022-10-28 PROCEDURE — 85014 HEMATOCRIT: CPT | Performed by: NURSE PRACTITIONER

## 2022-10-28 PROCEDURE — 63600175 PHARM REV CODE 636 W HCPCS: Performed by: NURSE PRACTITIONER

## 2022-10-28 PROCEDURE — 25000003 PHARM REV CODE 250: Performed by: INTERNAL MEDICINE

## 2022-10-28 PROCEDURE — 99223 PR INITIAL HOSPITAL CARE,LEVL III: ICD-10-PCS | Mod: ,,, | Performed by: INTERNAL MEDICINE

## 2022-10-28 PROCEDURE — 94640 AIRWAY INHALATION TREATMENT: CPT | Mod: XB

## 2022-10-28 PROCEDURE — 86140 C-REACTIVE PROTEIN: CPT | Performed by: PHYSICIAN ASSISTANT

## 2022-10-28 PROCEDURE — 96376 TX/PRO/DX INJ SAME DRUG ADON: CPT

## 2022-10-28 PROCEDURE — 85025 COMPLETE CBC W/AUTO DIFF WBC: CPT | Mod: 91 | Performed by: NURSE PRACTITIONER

## 2022-10-28 PROCEDURE — 80053 COMPREHEN METABOLIC PANEL: CPT | Mod: 91 | Performed by: NURSE PRACTITIONER

## 2022-10-28 PROCEDURE — 80053 COMPREHEN METABOLIC PANEL: CPT | Performed by: INTERNAL MEDICINE

## 2022-10-28 PROCEDURE — 87449 NOS EACH ORGANISM AG IA: CPT | Performed by: INTERNAL MEDICINE

## 2022-10-28 PROCEDURE — 87427 SHIGA-LIKE TOXIN AG IA: CPT | Mod: 59 | Performed by: INTERNAL MEDICINE

## 2022-10-28 PROCEDURE — 96367 TX/PROPH/DG ADDL SEQ IV INF: CPT

## 2022-10-28 PROCEDURE — 96361 HYDRATE IV INFUSION ADD-ON: CPT

## 2022-10-28 PROCEDURE — 87425 ROTAVIRUS AG IA: CPT | Performed by: INTERNAL MEDICINE

## 2022-10-28 PROCEDURE — 99223 1ST HOSP IP/OBS HIGH 75: CPT | Mod: ,,, | Performed by: INTERNAL MEDICINE

## 2022-10-28 PROCEDURE — 25000003 PHARM REV CODE 250: Performed by: NURSE PRACTITIONER

## 2022-10-28 PROCEDURE — 85018 HEMOGLOBIN: CPT | Performed by: NURSE PRACTITIONER

## 2022-10-28 PROCEDURE — 27000221 HC OXYGEN, UP TO 24 HOURS

## 2022-10-28 PROCEDURE — 87046 STOOL CULTR AEROBIC BACT EA: CPT | Mod: 59 | Performed by: INTERNAL MEDICINE

## 2022-10-28 PROCEDURE — 83735 ASSAY OF MAGNESIUM: CPT | Performed by: INTERNAL MEDICINE

## 2022-10-28 PROCEDURE — G0378 HOSPITAL OBSERVATION PER HR: HCPCS

## 2022-10-28 PROCEDURE — 21400001 HC TELEMETRY ROOM

## 2022-10-28 PROCEDURE — 36415 COLL VENOUS BLD VENIPUNCTURE: CPT | Performed by: NURSE PRACTITIONER

## 2022-10-28 PROCEDURE — 84100 ASSAY OF PHOSPHORUS: CPT | Performed by: INTERNAL MEDICINE

## 2022-10-28 PROCEDURE — C9113 INJ PANTOPRAZOLE SODIUM, VIA: HCPCS | Performed by: NURSE PRACTITIONER

## 2022-10-28 PROCEDURE — 96375 TX/PRO/DX INJ NEW DRUG ADDON: CPT

## 2022-10-28 PROCEDURE — 83605 ASSAY OF LACTIC ACID: CPT | Mod: 91 | Performed by: INTERNAL MEDICINE

## 2022-10-28 PROCEDURE — 36415 COLL VENOUS BLD VENIPUNCTURE: CPT | Performed by: PHYSICIAN ASSISTANT

## 2022-10-28 PROCEDURE — 87177 OVA AND PARASITES SMEARS: CPT | Performed by: INTERNAL MEDICINE

## 2022-10-28 PROCEDURE — 94761 N-INVAS EAR/PLS OXIMETRY MLT: CPT

## 2022-10-28 PROCEDURE — 83605 ASSAY OF LACTIC ACID: CPT | Performed by: NURSE PRACTITIONER

## 2022-10-28 PROCEDURE — 89055 LEUKOCYTE ASSESSMENT FECAL: CPT | Performed by: INTERNAL MEDICINE

## 2022-10-28 PROCEDURE — 87045 FECES CULTURE AEROBIC BACT: CPT | Performed by: INTERNAL MEDICINE

## 2022-10-28 PROCEDURE — 63600175 PHARM REV CODE 636 W HCPCS: Performed by: INTERNAL MEDICINE

## 2022-10-28 RX ORDER — MAGNESIUM SULFATE HEPTAHYDRATE 40 MG/ML
2 INJECTION, SOLUTION INTRAVENOUS
Status: COMPLETED | OUTPATIENT
Start: 2022-10-28 | End: 2022-10-28

## 2022-10-28 RX ORDER — LISINOPRIL 10 MG/1
10 TABLET ORAL DAILY
Status: DISCONTINUED | OUTPATIENT
Start: 2022-10-28 | End: 2022-10-31 | Stop reason: HOSPADM

## 2022-10-28 RX ADMIN — POTASSIUM BICARBONATE 50 MEQ: 978 TABLET, EFFERVESCENT ORAL at 09:10

## 2022-10-28 RX ADMIN — METRONIDAZOLE 500 MG: 500 TABLET ORAL at 05:10

## 2022-10-28 RX ADMIN — METHYLPREDNISOLONE SODIUM SUCCINATE 40 MG: 40 INJECTION, POWDER, FOR SOLUTION INTRAMUSCULAR; INTRAVENOUS at 05:10

## 2022-10-28 RX ADMIN — BUDESONIDE 0.5 MG: 0.5 INHALANT ORAL at 07:10

## 2022-10-28 RX ADMIN — OSELTAMIVIR PHOSPHATE 75 MG: 75 CAPSULE ORAL at 08:10

## 2022-10-28 RX ADMIN — AMLODIPINE BESYLATE 5 MG: 5 TABLET ORAL at 08:10

## 2022-10-28 RX ADMIN — PANTOPRAZOLE SODIUM 40 MG: 40 INJECTION, POWDER, FOR SOLUTION INTRAVENOUS at 08:10

## 2022-10-28 RX ADMIN — IPRATROPIUM BROMIDE AND ALBUTEROL SULFATE 3 ML: 2.5; .5 SOLUTION RESPIRATORY (INHALATION) at 07:10

## 2022-10-28 RX ADMIN — HYDRALAZINE HYDROCHLORIDE 10 MG: 20 INJECTION, SOLUTION INTRAMUSCULAR; INTRAVENOUS at 12:10

## 2022-10-28 RX ADMIN — LISINOPRIL 10 MG: 10 TABLET ORAL at 05:10

## 2022-10-28 RX ADMIN — METRONIDAZOLE 500 MG: 500 TABLET ORAL at 02:10

## 2022-10-28 RX ADMIN — PANTOPRAZOLE SODIUM 40 MG: 40 INJECTION, POWDER, FOR SOLUTION INTRAVENOUS at 09:10

## 2022-10-28 RX ADMIN — SODIUM BICARBONATE 650 MG: 650 TABLET ORAL at 02:10

## 2022-10-28 RX ADMIN — PANTOPRAZOLE SODIUM 80 MG: 40 INJECTION, POWDER, FOR SOLUTION INTRAVENOUS at 12:10

## 2022-10-28 RX ADMIN — IPRATROPIUM BROMIDE AND ALBUTEROL SULFATE 3 ML: 2.5; .5 SOLUTION RESPIRATORY (INHALATION) at 08:10

## 2022-10-28 RX ADMIN — CIPROFLOXACIN 400 MG: 2 INJECTION, SOLUTION INTRAVENOUS at 03:10

## 2022-10-28 RX ADMIN — POTASSIUM BICARBONATE 50 MEQ: 978 TABLET, EFFERVESCENT ORAL at 03:10

## 2022-10-28 RX ADMIN — SODIUM BICARBONATE 650 MG: 650 TABLET ORAL at 08:10

## 2022-10-28 RX ADMIN — POTASSIUM CHLORIDE AND SODIUM CHLORIDE: 450; 150 INJECTION, SOLUTION INTRAVENOUS at 08:10

## 2022-10-28 RX ADMIN — METRONIDAZOLE 500 MG: 500 TABLET ORAL at 10:10

## 2022-10-28 RX ADMIN — CETIRIZINE HYDROCHLORIDE 10 MG: 10 TABLET, FILM COATED ORAL at 08:10

## 2022-10-28 RX ADMIN — IPRATROPIUM BROMIDE AND ALBUTEROL SULFATE 3 ML: 2.5; .5 SOLUTION RESPIRATORY (INHALATION) at 01:10

## 2022-10-28 RX ADMIN — METHYLPREDNISOLONE SODIUM SUCCINATE 40 MG: 40 INJECTION, POWDER, FOR SOLUTION INTRAMUSCULAR; INTRAVENOUS at 12:10

## 2022-10-28 RX ADMIN — MAGNESIUM SULFATE HEPTAHYDRATE 2 G: 40 INJECTION, SOLUTION INTRAVENOUS at 05:10

## 2022-10-28 RX ADMIN — BUDESONIDE 0.5 MG: 0.5 INHALANT ORAL at 08:10

## 2022-10-28 RX ADMIN — MAGNESIUM SULFATE HEPTAHYDRATE 2 G: 40 INJECTION, SOLUTION INTRAVENOUS at 03:10

## 2022-10-28 RX ADMIN — POTASSIUM BICARBONATE 50 MEQ: 978 TABLET, EFFERVESCENT ORAL at 05:10

## 2022-10-28 RX ADMIN — METHYLPREDNISOLONE SODIUM SUCCINATE 40 MG: 40 INJECTION, POWDER, FOR SOLUTION INTRAMUSCULAR; INTRAVENOUS at 11:10

## 2022-10-28 RX ADMIN — IPRATROPIUM BROMIDE AND ALBUTEROL SULFATE 3 ML: 2.5; .5 SOLUTION RESPIRATORY (INHALATION) at 12:10

## 2022-10-28 RX ADMIN — Medication 6 MG: at 08:10

## 2022-10-28 NOTE — PLAN OF CARE
[facility] - Telemetry (Hospital)  Discharge Assessment    Primary Care Provider: Primary Doctor No     Discharge Assessment (most recent)       BRIEF DISCHARGE ASSESSMENT - 10/28/22 1101          Discharge Planning    Assessment Type Discharge Planning Brief Assessment     Resource/Environmental Concerns none     Support Systems Family members;Children     Assistance Needed NA     Equipment Currently Used at Home none     Current Living Arrangements home/apartment/condo     Patient/Family Anticipates Transition to home;home with family     Patient/Family Anticipated Services at Transition none     DME Needed Upon Discharge  none     Discharge Plan A Home;Home with family                   Medical chart review completed. Pt did not answer room phone and no answer from contact number listed on face sheet. Per chart review, pt's PCP is with [facility].     SW to continue following to assist with discharge needs.

## 2022-10-28 NOTE — NURSING
Last VISIT - 1/6/22 HFU    Last CPE - No recent physical    Last REFILL -     atorvastatin 40 MG Oral Tab 90 tablet 1 1/6/2022     Last LABS - No recent labs    No future appointments. Per PROTOCOL? FAILED    Please Approve or Deny. Notified Alyson Cullen NP about patient having bloody stool. She ordered Hemoglobin and Hematocrit labs stat. Patient is now NPO.

## 2022-10-28 NOTE — SUBJECTIVE & OBJECTIVE
Review of Systems   Constitutional:  Positive for activity change, appetite change, chills, fatigue and fever.   HENT:  Negative for sore throat.    Eyes:  Negative for visual disturbance.   Respiratory:  Positive for cough and shortness of breath. Negative for chest tightness.    Cardiovascular:  Negative for chest pain, palpitations and leg swelling.   Gastrointestinal:  Positive for abdominal pain, diarrhea, nausea and vomiting. Negative for abdominal distention and constipation.   Endocrine: Negative for polyuria.   Genitourinary:  Negative for decreased urine volume, dysuria, flank pain, frequency and hematuria.   Musculoskeletal:  Negative for back pain and gait problem.   Skin:  Negative for rash.   Neurological:  Negative for syncope, speech difficulty, weakness, light-headedness and headaches.   Psychiatric/Behavioral:  Negative for confusion, hallucinations and sleep disturbance.    Objective:     Vital Signs (Most Recent):  Temp: 98.7 °F (37.1 °C) (10/28/22 1602)  Pulse: 89 (10/28/22 1700)  Resp: 19 (10/28/22 1602)  BP: (!) 160/91 (10/28/22 1602)  SpO2: 97 % (10/28/22 1602)   Vital Signs (24h Range):  Temp:  [98.2 °F (36.8 °C)-99.3 °F (37.4 °C)] 98.7 °F (37.1 °C)  Pulse:  [] 89  Resp:  [16-30] 19  SpO2:  [95 %-98 %] 97 %  BP: (138-185)/(67-98) 160/91     Weight: 101.3 kg (223 lb 5.2 oz)  Body mass index is 32.98 kg/m².    Intake/Output Summary (Last 24 hours) at 10/28/2022 1727  Last data filed at 10/28/2022 1628  Gross per 24 hour   Intake 1853.55 ml   Output --   Net 1853.55 ml      Physical Exam  Constitutional:       General: She is not in acute distress.     Appearance: She is well-developed. She is ill-appearing. She is not diaphoretic.   HENT:      Head: Normocephalic and atraumatic.      Mouth/Throat:      Mouth: Mucous membranes are dry.      Pharynx: No oropharyngeal exudate.   Eyes:      Conjunctiva/sclera: Conjunctivae normal.      Pupils: Pupils are equal, round, and reactive to  light.   Neck:      Thyroid: No thyromegaly.      Vascular: No JVD.   Cardiovascular:      Rate and Rhythm: Normal rate and regular rhythm.      Heart sounds: Normal heart sounds. No murmur heard.  Pulmonary:      Effort: Pulmonary effort is normal. No respiratory distress.      Breath sounds: Wheezing present. No rhonchi or rales.   Chest:      Chest wall: No tenderness.   Abdominal:      General: Bowel sounds are normal. There is no distension.      Palpations: Abdomen is soft.      Tenderness: There is abdominal tenderness (generalized, worse in the lower). There is no guarding or rebound.   Musculoskeletal:         General: Normal range of motion.      Cervical back: Normal range of motion and neck supple.      Right lower leg: No edema.      Left lower leg: No edema.   Lymphadenopathy:      Cervical: No cervical adenopathy.   Skin:     General: Skin is warm and dry.      Findings: No rash.   Neurological:      General: No focal deficit present.      Mental Status: She is alert and oriented to person, place, and time.      Cranial Nerves: No cranial nerve deficit.      Sensory: No sensory deficit.      Deep Tendon Reflexes: Reflexes normal.   Psychiatric:         Mood and Affect: Mood normal.       Significant Labs: All pertinent labs within the past 24 hours have been reviewed.  Blood Culture:   Recent Labs   Lab 10/27/22  1252 10/27/22  1301   LABBLOO No Growth to date No Growth to date     BMP:   Recent Labs   Lab 10/28/22  0501   *      K 2.6*      CO2 22*   BUN 6   CREATININE 0.8   CALCIUM 7.2*   MG 1.0*     CBC:   Recent Labs   Lab 10/27/22  1231 10/28/22  0011 10/28/22  0501 10/28/22  1644   WBC 9.10  --  6.32 5.24   HGB 11.3* 9.6* 9.2* 11.2*   HCT 32.7* 27.7* 26.6* 32.4*     --  218 238     CMP:   Recent Labs   Lab 10/27/22  1231 10/28/22  0011 10/28/22  0501    139 139   K 2.3* 2.5* 2.6*    104 103   CO2 17* 19* 22*   * 158* 184*   BUN 7 6 6   CREATININE 0.9  0.8 0.8   CALCIUM 8.0* 7.4* 7.2*   PROT 6.8 6.1 5.7*   ALBUMIN 4.0 3.4* 3.2*   BILITOT 0.4 0.4 0.5   ALKPHOS 69 57 59   AST 25 19 17   ALT 10 9* 9*   ANIONGAP 21* 16 14     Lactic Acid:   Recent Labs   Lab 10/27/22  1926 10/28/22  0011 10/28/22  0501   LACTATE 9.6* 4.9* 3.9*     Urine Culture: No results for input(s): LABURIN in the last 48 hours.  Urine Studies: No results for input(s): COLORU, APPEARANCEUA, PHUR, SPECGRAV, PROTEINUA, GLUCUA, KETONESU, BILIRUBINUA, OCCULTUA, NITRITE, UROBILINOGEN, LEUKOCYTESUR, RBCUA, WBCUA, BACTERIA, SQUAMEPITHEL, HYALINECASTS in the last 48 hours.    Invalid input(s): TAMI    Significant Imaging: I have reviewed all pertinent imaging results/findings within the past 24 hours.

## 2022-10-28 NOTE — PLAN OF CARE
Problem: Adult Inpatient Plan of Care  Goal: Absence of Hospital-Acquired Illness or Injury  Outcome: Ongoing, Progressing  Goal: Optimal Comfort and Wellbeing  Outcome: Ongoing, Progressing     Problem: Adjustment to Illness (Sepsis/Septic Shock)  Goal: Optimal Coping  Outcome: Ongoing, Progressing     POC reviewed with pt. Pt verbalizes understanding of POC. No questions at this time.  AAOx4. NAD.  NSR on cardiac monitor.  Pt remains free of falls.  No complaints at this time.  Safety measures in place.   Informed pt to call for assistance before getting up. Pt verbalizes understanding.

## 2022-10-28 NOTE — HPI
The patient presented to the ER for SOB, cough, wheezing, diarrhea and abdominal pain. She was evaluated at [facility] three days ago with CT scan showing multifocal luminal narrowing and wall thickening present in the colon and rectum which may represent acute colitis. The terminal ileum is also narrowed. There was a 5.2 cm right adnexal mass. Her WBC count was normal, but lactate elevated and potassium low. She was discharged from the ER with Cipro and Flagyl. The patient has not had any improvement in symptoms. Initial labs here were similar but lactate was higher at 9.6. Potassium 2.3 with normal BUN and creatinine. She is afebrile. Hgb was 11.3 but she had a bloody stool overnight with repeat Hgb today of 9.2. CT with contrast showed large bowel collapsed which limits evaluation. There is mild thickening of the transverse and descending colon which could reflect mild infectious or inflammatory colitis. She was started on IV Cipro, Flagyl and Protonix. Stool studies and blood cultures pending. The patient continues to have intermittent, lower abdominal cramping. She denies nausea or vomiting but has a productive cough. She denies a change in appetite. She actually has had dark blood in her stools over the last week but didn't think to report it. She doesn't quantify her diarrhea but has had three episodes already today and it affects her sleep. She denies sick contacts. She doesn't take medications routinely but started sinus meds and Ibuprofen over the last week for her acute symptoms. She has never had a colonoscopy and denies any known family history of bowel disease.

## 2022-10-28 NOTE — ASSESSMENT & PLAN NOTE
Pt meets two SIRS criteria HR>90, RR >20     This patient does have evidence of infective focus  My overall impression is sepsis. Vital signs were reviewed and noted in progress note.  Antibiotics given-   Antibiotics (From admission, onward)    Start     Stop Route Frequency Ordered    10/27/22 2200  metroNIDAZOLE tablet 500 mg         -- Oral Every 8 hours 10/27/22 1454    10/27/22 1600  ciprofloxacin (CIPRO)400mg/200ml D5W IVPB 400 mg         -- IV Every 12 hours (non-standard times) 10/27/22 1454        Cultures were taken-   Microbiology Results (last 7 days)     Procedure Component Value Units Date/Time    Stool culture [[id number]] Collected: 10/28/22 0345    Order Status: Sent Specimen: Stool Updated: 10/28/22 1657    E. coli 0157 antigen [[id number]] Collected: 10/28/22 0345    Order Status: No result Specimen: Stool Updated: 10/28/22 1657    Clostridium difficile EIA [[id number]] Collected: 10/28/22 0345    Order Status: Completed Specimen: Stool Updated: 10/28/22 0926     C. diff Antigen Negative     C difficile Toxins A+B, EIA Negative     Comment: Testing not recommended for children <24 months old.       Blood Culture #1 **CANNOT BE ORDERED STAT** [[id number]] Collected: 10/27/22 1252    Order Status: Completed Specimen: Blood from Peripheral, Antecubital, Left Updated: 10/28/22 0715     Blood Culture, Routine No Growth to date    Blood Culture #2 **CANNOT BE ORDERED STAT** [[id number]] Collected: 10/27/22 1301    Order Status: Completed Specimen: Blood Updated: 10/28/22 0715     Blood Culture, Routine No Growth to date        Latest lactate reviewed, they are-  Recent Labs   Lab 10/27/22  1926 10/28/22  0011 10/28/22  0501   LACTATE 9.6* 4.9* 3.9*       Organ dysfunction indicated by none  Source- GI tract , Respiratory tract     Source control Achieved by-     Antiviral   Empiric antibacterial targeting intra abdominal infection

## 2022-10-28 NOTE — NURSING
Notified Alyson Butt NP about patient having a critical Lactic Acid blood level of 9.61. She ordered repeat labs and a bolus of NS. Patient also has steroids available to manage SOB. Pt oxygen level is 99% on 3 liters NC.

## 2022-10-28 NOTE — ASSESSMENT & PLAN NOTE
- Likely viral. Get stool study   -Empiric antibiotic treatment targeting intra abdominal infection with Cipro and Flagyl   -Fluid resuscitation   -Electrolyte replacement   -Other supportive treatment     10/28/22  -Continue abx therapy   -Follow stool studies   -C-diff negative   -CRP and ESR normal

## 2022-10-28 NOTE — HOSPITAL COURSE
Patient admitted for sepsis due to gastroenteritis and colitis and started on IV cipro/flagyl. Stool studies pending. GI consult. Serum potassium 2.6 and magnesium 1.0. Will replete. Blood pressure elevated will start lisinopril. 10/29/22: Wheezing continued despite treatment. Pulmonology consult. CMP pending, will replace electrolytes as needed. Patient tolerating diet.     10/30 She report fee;ling better . Cont with b/l wheezing and sob on minimal exertion . CT chest caryn cute abnormality . Jovanny for c.diff negative . GI rec to cont antibiotic and outpt c-scope     10/31 Pt was seen and examined at bedside . She was determined to be suitable for d/c .   She con with mild B/L  wheezing  which has improved since admission . She did not qualify for home o2 .  She is requesting to go home .  She will be d/c on 2 week  prednisone taper , Advair ,  albuterol inhaler and  7 days course of cipro and metronidazole . The hypokalemia and hypomagnesemia were corrected before d/c  with Kdur 40 meq po x1 and 2 gr of magnesium sulfate  IV x 1  . Pt will be d/c on  losartan 25 mg po qd . CM  consulted for [facility]  referral for GI and PCP clinic .

## 2022-10-28 NOTE — ASSESSMENT & PLAN NOTE
- Systemic steroid   -Inhaled bronchodilators and ICS     1930: Bedside shift change report given to Nely CAUSEY (oncoming nurse) by Michelet Soares RN (offgoing nurse). Report included the following information SBAR, Kardex, Procedure Summary, Intake/Output, MAR, and Recent Results. 2015: S/w [facility] nurse to schedule dialysis. [facility] stated dialysis has been rescheduled to tomorrow per Dr. Christ Griffith. 2130: Permacath dressing bleeding. New dressing applied. 2330: Permacath dressign still oozing, New dressing applied. 0245: Permacath dressing saturated. Pressure held x10 minutes. New dressing applied. Pt received CHG bath. 0500: Permacath dressing saturated with large clot oozing out of dressing. New dressing applied. 0630: Permacath dressing bleeding, new dressing applied. 0730: Bedside shift change report given to [address] [place] [address],[address] (oncoming nurse) by Devon Le RN (offgoing nurse). Report included the following information SBAR, Kardex, Procedure Summary, Intake/Output, MAR and Recent Results. Problem: Falls - Risk of 
Goal: *Absence of Falls Description: Document Herlinda Shore Fall Risk and appropriate interventions in the flowsheet. Outcome: Progressing Towards Goal 
Note: Fall Risk Interventions: 
Mobility Interventions: Patient to call before getting OOB, PT Consult for mobility concerns, PT Consult for assist device competence Medication Interventions: Assess postural VS orthostatic hypotension, Patient to call before getting OOB, Teach patient to arise slowly Elimination Interventions: Call light in reach, Toilet paper/wipes in reach, Toileting schedule/hourly rounds History of Falls Interventions: Door open when patient unattended, Consult care management for discharge planning Problem: Patient Education: Go to Patient Education Activity Goal: Patient/Family Education Outcome: Progressing Towards Goal 
  
Problem: Pressure Injury - Risk of 
Goal: *Prevention of pressure injury Description: Document Jaime Scale and appropriate interventions in the flowsheet. Outcome: Progressing Towards Goal 
Note: Pressure Injury Interventions: 
Sensory Interventions: Avoid rigorous massage over bony prominences, Keep linens dry and wrinkle-free, Maintain/enhance activity level Moisture Interventions: Check for incontinence Q2 hours and as needed, Apply protective barrier, creams and emollients Activity Interventions: Increase time out of bed, Pressure redistribution bed/mattress(bed type) Mobility Interventions: HOB 30 degrees or less, Pressure redistribution bed/mattress (bed type), PT/OT evaluation Nutrition Interventions: Document food/fluid/supplement intake, Discuss nutritional consult with provider Friction and Shear Interventions: Foam dressings/transparent film/skin sealants, HOB 30 degrees or less Problem: Patient Education: Go to Patient Education Activity Goal: Patient/Family Education Outcome: Progressing Towards Goal 
  
Problem: Heart Failure: Day 1 Goal: Activity/Safety Outcome: Progressing Towards Goal 
Goal: Diagnostic Test/Procedures Outcome: Progressing Towards Goal

## 2022-10-28 NOTE — PROGRESS NOTES
[facility] Medicine  Progress Note    Patient Name: Lucero Townsend  MRN: [id number]  Patient Class: OP- Observation   Admission Date: 10/27/2022  Length of Stay: 0 days  Attending Physician: Juan Gasca, *  Primary Care Provider: Primary Doctor No        Subjective:     Principal Problem:Sepsis        HPI:  The pt is a 60 yo female with PMHx significant for Asthma and tobacco abuse presented to the ED for evaluation of 4 days h/o cough, chest congestion,SOB, wheezing,  fever, chills, abdominal pain and cramp across the lower abd , nausea , vomiting and multiple episodes of diarrhea throughout the day with dark, loose stools. Denies chest pain, Pt was seen at [facility] on 10/25/22 for similar symptoms and diagnosed with colitis and asthma  and was discharged  home on oral Cipro /Flagyl and medrol dosepak. Pt returns here today and reports symptoms did not improve and now can't keep food down due to vomiting and still having diarrhea. Additionally pt is noted to have positive Influenza A. Unsure of sick contact and no recent travel. She is a current everyday smoker , denies drinking alcohol. Vitals on presentation - 140/90, P-93, RR-30, SpO2 99% on 3 L, T-98.9. Labs - WBC 9.1, K 2.3, CO2 17, Cr 0.9, Lipase normal, LA 5.8, BNP 60, Troponin 0.009, PCT 0.05, CXR - clear lungs, CT abd/pelvis - mild thickening of the transverse and descending colon suggestive of colitis. Pt received 30ml/kg bolus fluid, Cipro, Flagyl , Solumedrol 125 mg in the ED and  consulted for admission.     Pt will be placed in observation under  service for sepsis due to colitis and influenza, Severe hypokalemia due to gastroenteritis and vomiting, and Metabolic acidosis/ Lactic acidosis.       Overview/Hospital Course:  Patient admitted for sepsis due to gastroenteritis and colitis and started on IV cipro/flagyl. Stool studies pending. GI consult. Serum potassium 2.6 and magnesium 1.0. Will replete. Blood  pressure elevated will start lisinopril.           Review of Systems   Constitutional:  Positive for activity change, appetite change, chills, fatigue and fever.   HENT:  Negative for sore throat.    Eyes:  Negative for visual disturbance.   Respiratory:  Positive for cough and shortness of breath. Negative for chest tightness.    Cardiovascular:  Negative for chest pain, palpitations and leg swelling.   Gastrointestinal:  Positive for abdominal pain, diarrhea, nausea and vomiting. Negative for abdominal distention and constipation.   Endocrine: Negative for polyuria.   Genitourinary:  Negative for decreased urine volume, dysuria, flank pain, frequency and hematuria.   Musculoskeletal:  Negative for back pain and gait problem.   Skin:  Negative for rash.   Neurological:  Negative for syncope, speech difficulty, weakness, light-headedness and headaches.   Psychiatric/Behavioral:  Negative for confusion, hallucinations and sleep disturbance.    Objective:     Vital Signs (Most Recent):  Temp: 98.7 °F (37.1 °C) (10/28/22 1602)  Pulse: 89 (10/28/22 1700)  Resp: 19 (10/28/22 1602)  BP: (!) 160/91 (10/28/22 1602)  SpO2: 97 % (10/28/22 1602)   Vital Signs (24h Range):  Temp:  [98.2 °F (36.8 °C)-99.3 °F (37.4 °C)] 98.7 °F (37.1 °C)  Pulse:  [] 89  Resp:  [16-30] 19  SpO2:  [95 %-98 %] 97 %  BP: (138-185)/(67-98) 160/91     Weight: 101.3 kg (223 lb 5.2 oz)  Body mass index is 32.98 kg/m².    Intake/Output Summary (Last 24 hours) at 10/28/2022 1727  Last data filed at 10/28/2022 1628  Gross per 24 hour   Intake 1853.55 ml   Output --   Net 1853.55 ml      Physical Exam  Constitutional:       General: She is not in acute distress.     Appearance: She is well-developed. She is ill-appearing. She is not diaphoretic.   HENT:      Head: Normocephalic and atraumatic.      Mouth/Throat:      Mouth: Mucous membranes are dry.      Pharynx: No oropharyngeal exudate.   Eyes:      Conjunctiva/sclera: Conjunctivae normal.       Pupils: Pupils are equal, round, and reactive to light.   Neck:      Thyroid: No thyromegaly.      Vascular: No JVD.   Cardiovascular:      Rate and Rhythm: Normal rate and regular rhythm.      Heart sounds: Normal heart sounds. No murmur heard.  Pulmonary:      Effort: Pulmonary effort is normal. No respiratory distress.      Breath sounds: Wheezing present. No rhonchi or rales.   Chest:      Chest wall: No tenderness.   Abdominal:      General: Bowel sounds are normal. There is no distension.      Palpations: Abdomen is soft.      Tenderness: There is abdominal tenderness (generalized, worse in the lower). There is no guarding or rebound.   Musculoskeletal:         General: Normal range of motion.      Cervical back: Normal range of motion and neck supple.      Right lower leg: No edema.      Left lower leg: No edema.   Lymphadenopathy:      Cervical: No cervical adenopathy.   Skin:     General: Skin is warm and dry.      Findings: No rash.   Neurological:      General: No focal deficit present.      Mental Status: She is alert and oriented to person, place, and time.      Cranial Nerves: No cranial nerve deficit.      Sensory: No sensory deficit.      Deep Tendon Reflexes: Reflexes normal.   Psychiatric:         Mood and Affect: Mood normal.       Significant Labs: All pertinent labs within the past 24 hours have been reviewed.  Blood Culture:   Recent Labs   Lab 10/27/22  1252 10/27/22  1301   LABBLOO No Growth to date No Growth to date     BMP:   Recent Labs   Lab 10/28/22  0501   *      K 2.6*      CO2 22*   BUN 6   CREATININE 0.8   CALCIUM 7.2*   MG 1.0*     CBC:   Recent Labs   Lab 10/27/22  1231 10/28/22  0011 10/28/22  0501 10/28/22  1644   WBC 9.10  --  6.32 5.24   HGB 11.3* 9.6* 9.2* 11.2*   HCT 32.7* 27.7* 26.6* 32.4*     --  218 238     CMP:   Recent Labs   Lab 10/27/22  1231 10/28/22  0011 10/28/22  0501    139 139   K 2.3* 2.5* 2.6*    104 103   CO2 17* 19* 22*    * 158* 184*   BUN 7 6 6   CREATININE 0.9 0.8 0.8   CALCIUM 8.0* 7.4* 7.2*   PROT 6.8 6.1 5.7*   ALBUMIN 4.0 3.4* 3.2*   BILITOT 0.4 0.4 0.5   ALKPHOS 69 57 59   AST 25 19 17   ALT 10 9* 9*   ANIONGAP 21* 16 14     Lactic Acid:   Recent Labs   Lab 10/27/22  1926 10/28/22  0011 10/28/22  0501   LACTATE 9.6* 4.9* 3.9*     Urine Culture: No results for input(s): LABURIN in the last 48 hours.  Urine Studies: No results for input(s): COLORU, APPEARANCEUA, PHUR, SPECGRAV, PROTEINUA, GLUCUA, KETONESU, BILIRUBINUA, OCCULTUA, NITRITE, UROBILINOGEN, LEUKOCYTESUR, RBCUA, WBCUA, BACTERIA, SQUAMEPITHEL, HYALINECASTS in the last 48 hours.    Invalid input(s): WRIGHTSUR    Significant Imaging: I have reviewed all pertinent imaging results/findings within the past 24 hours.      Assessment/Plan:      * Sepsis  Pt meets two SIRS criteria HR>90, RR >20     This patient does have evidence of infective focus  My overall impression is sepsis. Vital signs were reviewed and noted in progress note.  Antibiotics given-   Antibiotics (From admission, onward)    Start     Stop Route Frequency Ordered    10/27/22 2200  metroNIDAZOLE tablet 500 mg         -- Oral Every 8 hours 10/27/22 1454    10/27/22 1600  ciprofloxacin (CIPRO)400mg/200ml D5W IVPB 400 mg         -- IV Every 12 hours (non-standard times) 10/27/22 1454        Cultures were taken-   Microbiology Results (last 7 days)     Procedure Component Value Units Date/Time    Stool culture [[id number]] Collected: 10/28/22 0345    Order Status: Sent Specimen: Stool Updated: 10/28/22 1657    E. coli 0157 antigen [[id number]] Collected: 10/28/22 0345    Order Status: No result Specimen: Stool Updated: 10/28/22 1657    Clostridium difficile EIA [[id number]] Collected: 10/28/22 0345    Order Status: Completed Specimen: Stool Updated: 10/28/22 0926     C. diff Antigen Negative     C difficile Toxins A+B, EIA Negative     Comment: Testing not recommended for children <24 months old.        Blood Culture #1 **CANNOT BE ORDERED STAT** [[id number]] Collected: 10/27/22 1252    Order Status: Completed Specimen: Blood from Peripheral, Antecubital, Left Updated: 10/28/22 0715     Blood Culture, Routine No Growth to date    Blood Culture #2 **CANNOT BE ORDERED STAT** [[id number]] Collected: 10/27/22 1301    Order Status: Completed Specimen: Blood Updated: 10/28/22 0715     Blood Culture, Routine No Growth to date        Latest lactate reviewed, they are-  Recent Labs   Lab 10/27/22  1926 10/28/22  0011 10/28/22  0501   LACTATE 9.6* 4.9* 3.9*       Organ dysfunction indicated by none  Source- GI tract , Respiratory tract     Source control Achieved by-     Antiviral   Empiric antibacterial targeting intra abdominal infection           Obesity (BMI 30-39.9)  Body mass index is 32.98 kg/m². Morbid obesity complicates all aspects of disease management from diagnostic modalities to treatment. Weight loss encouraged and health benefits explained to patient.         Current every day smoker  Assistance with smoking cessation was offered, including:  []  Medications  [x]  Counseling  []  Printed Information on Smoking Cessation  []  Referral to a Smoking Cessation Program    Patient was counseled regarding smoking for 3-10 minutes.          Macrocytic anemia  - Denies h/o alcohol use    iron study , B12, folic acid level pending       Metabolic acidosis/ Lactic Acidosis   - Volume resuscitation   -Bicarbonate replacement     Improving       Hypokalemia due to excessive gastrointestinal loss of potassium  - Due to vomiting and diarrhea in the setting of gastroenteritis   -Replete as indicated       Asthma with acute exacerbation  - Systemic steroid   -Inhaled bronchodilators and ICS      Influenza A infection   - Oseltamivir 75 mg bid x 5 days       Nausea & vomiting  - Anti emetics as needed       Gastroenteritis and colitis  - Likely viral. Get stool study   -Empiric antibiotic treatment targeting intra abdominal  infection with Cipro and Flagyl   -Fluid resuscitation   -Electrolyte replacement   -Other supportive treatment     10/28/22  -Continue abx therapy   -Follow stool studies   -C-diff negative   -CRP and ESR normal       VTE Risk Mitigation (From admission, onward)         Ordered     Place sequential compression device  Until discontinued         10/27/22 1650     IP VTE LOW RISK PATIENT  Once         10/27/22 1650                Discharge Planning   MANAV:      Code Status: Full Code   Is the patient medically ready for discharge?:     Reason for patient still in hospital (select all that apply): Patient trending condition, Laboratory test and Treatment  Discharge Plan A: Home, Home with family                  Bailee Rebolledo NP  Department of Hospital Medicine   [facility] - Telemetry ([facility])

## 2022-10-28 NOTE — NURSING
Patient is on tele monitor, NSR. Patient had elevated Lactic acid, potassium, and magnesium during the night. Patient had bloody stool. Monitored H&H, which is slowly trending down. Gi was consulted. Patient is NPO. Patient is execrated upon activity. No new orders at this time.

## 2022-10-28 NOTE — ASSESSMENT & PLAN NOTE
Onset was sudden earlier this week, most consistent with infection. Agree with stool studies and IV antibiotics. However, if clinical course doesn't improve, she will need an inpatient diagnostic colonoscopy. New UC is in the differential. No risk factors for ischemic colitis but lactate was elevated on admit. Continue supportive care. Will monitor clinical course.   Check CRP and ESR.   Add WBC count to stool studies.   Monitor K and Hgb.

## 2022-10-28 NOTE — CONSULTS
[facility] - Telemetry ([facility])  Gastroenterology  Consult Note    Patient Name: Lucero Townsend  MRN: [id number]  Admission Date: 10/27/2022  Hospital Length of Stay: 0 days  Code Status: Full Code   Attending Provider: Juan Gasca, *   Consulting Provider: Rock Vieira PA-C  Primary Care Physician: Primary Doctor No  Principal Problem:Sepsis    Inpatient consult to Gastroenterology  Consult performed by: Rock Vieira PA-C  Consult ordered by: Alyson Cullen NP  Reason for consult: Hematochezia        Subjective:     HPI:  The patient presented to the ER for SOB, cough, wheezing, diarrhea and abdominal pain. She was evaluated at [facility] three days ago with CT scan showing multifocal luminal narrowing and wall thickening present in the colon and rectum which may represent acute colitis. The terminal ileum is also narrowed. There was a 5.2 cm right adnexal mass. Her WBC count was normal, but lactate elevated and potassium low. She was discharged from the ER with Cipro and Flagyl. The patient has not had any improvement in symptoms. Initial labs here were similar but lactate was higher at 9.6. Potassium 2.3 with normal BUN and creatinine. She is afebrile. Hgb was 11.3 but she had a bloody stool overnight with repeat Hgb today of 9.2. CT with contrast showed large bowel collapsed which limits evaluation. There is mild thickening of the transverse and descending colon which could reflect mild infectious or inflammatory colitis. She was started on IV Cipro, Flagyl and Protonix. Stool studies and blood cultures pending. The patient continues to have intermittent, lower abdominal cramping. She denies nausea or vomiting but has a productive cough. She denies a change in appetite. She actually has had dark blood in her stools over the last week but didn't think to report it. She doesn't quantify her diarrhea but has had three episodes already today and it affects her sleep. She denies sick contacts. She  doesn't take medications routinely but started sinus meds and Ibuprofen over the last week for her acute symptoms. She has never had a colonoscopy and denies any known family history of bowel disease.       Past Medical History:   Diagnosis Date    Asthma        History reviewed. No pertinent surgical history.    Review of patient's allergies indicates:  No Known Allergies  Family History       Problem Relation (Age of Onset)    Cancer Mother    Diabetes Mother, Father          Tobacco Use    Smoking status: Every Day     Types: Cigarettes    Smokeless tobacco: Never   Substance and Sexual Activity    Alcohol use: Not on file     Comment: occ    Drug use: Never    Sexual activity: Not on file     Review of Systems   Constitutional:  Negative for fever (subjective).   HENT:  Negative for hearing loss.    Eyes:  Negative for visual disturbance.   Respiratory:  Positive for cough, shortness of breath and wheezing.    Cardiovascular:  Negative for chest pain.   Gastrointestinal:         As per HPI.   Genitourinary:  Negative for dysuria, frequency and hematuria.   Musculoskeletal:  Negative for arthralgias and back pain.   Skin:  Negative for rash.   Neurological:  Negative for seizures, syncope, numbness and headaches.   Hematological:  Does not bruise/bleed easily.   Psychiatric/Behavioral:  The patient is not nervous/anxious.    Objective:     Vital Signs (Most Recent):  Temp: 99.1 °F (37.3 °C) (10/28/22 0845)  Pulse: 76 (10/28/22 0847)  Resp: (!) 30 (10/28/22 0845)  BP: (!) 154/74 (10/28/22 0847)  SpO2: 95 % (10/28/22 0847)   Vital Signs (24h Range):  Temp:  [98.2 °F (36.8 °C)-99.3 °F (37.4 °C)] 99.1 °F (37.3 °C)  Pulse:  [] 76  Resp:  [16-30] 30  SpO2:  [92 %-99 %] 95 %  BP: (140-194)/(67-98) 154/74     Weight: 101.3 kg (223 lb 5.2 oz) (10/27/22 1150)  Body mass index is 32.98 kg/m².      Intake/Output Summary (Last 24 hours) at 10/28/2022 0851  Last data filed at 10/27/2022 1652  Gross per 24 hour    Intake 1087.36 ml   Output --   Net 1087.36 ml       Lines/Drains/Airways       Peripheral Intravenous Line  Duration                  Peripheral IV - Single Lumen 10/27/22 2034 20 G Anterior;Right Forearm <1 day         Peripheral IV - Single Lumen 10/27/22 2034 22 G Posterior;Right Forearm <1 day                    Physical Exam  Constitutional:       General: She is not in acute distress.     Appearance: Normal appearance. She is well-developed. She is not ill-appearing.   HENT:      Head: Normocephalic and atraumatic.   Eyes:      Extraocular Movements: Extraocular movements intact.   Cardiovascular:      Rate and Rhythm: Normal rate and regular rhythm.      Heart sounds: No murmur heard.  Pulmonary:      Effort: Pulmonary effort is normal. No respiratory distress.      Breath sounds: Wheezing (bilateral) present.   Abdominal:      General: Bowel sounds are normal. There is no distension.      Palpations: Abdomen is soft. There is no mass.      Tenderness: There is abdominal tenderness (generalized but worse along the lower abdomen). There is no guarding or rebound.   Musculoskeletal:      Cervical back: Normal range of motion and neck supple.      Right lower leg: No edema.      Left lower leg: No edema.   Skin:     General: Skin is warm and dry.      Findings: No rash.   Neurological:      Mental Status: She is alert and oriented to person, place, and time.      Cranial Nerves: No cranial nerve deficit.   Psychiatric:         Behavior: Behavior normal.       Significant Labs:  CBC:   Recent Labs   Lab 10/27/22  1231 10/28/22  0011 10/28/22  0501   WBC 9.10  --  6.32   HGB 11.3* 9.6* 9.2*   HCT 32.7* 27.7* 26.6*     --  218     CMP:   Recent Labs   Lab 10/28/22  0501   *   CALCIUM 7.2*   ALBUMIN 3.2*   PROT 5.7*      K 2.6*   CO2 22*      BUN 6   CREATININE 0.8   ALKPHOS 59   ALT 9*   AST 17   BILITOT 0.5     Coagulation:   Recent Labs   Lab 10/27/22  1231   INR 1.1       Significant  Imaging:  Imaging results within the past 24 hours have been reviewed.    Assessment/Plan:     Gastroenteritis and colitis  Onset was sudden earlier this week, most consistent with infection. Agree with stool studies and IV antibiotics. However, if clinical course doesn't improve, she will need an inpatient diagnostic colonoscopy. New UC is in the differential. No risk factors for ischemic colitis but lactate was elevated on admit. Continue supportive care. Will monitor clinical course.   Check CRP and ESR.   Add WBC count to stool studies.   Monitor K and Hgb.      Macrocytic anemia  Patient reports bloody stools. Continue to monitor H/H and transfuse as indicated.     Diarrhea of presumed infectious origin  See plan above. Continue supportive care and correction of electrolytes.     Hypokalemia due to excessive gastrointestinal loss of potassium  Replacement of potassium per  team. Continue to monitor.         Thank you for your consult. I will follow-up with patient. Please contact us if you have any additional questions.    Rock Vieira PA-C  Gastroenterology  [facility] - Telemetry ([facility])

## 2022-10-28 NOTE — NURSING
Notified Alyson Cullen NP about critical  labs Potassium is  2.6 and lactic acid  was 3.9. No new orders at  this time.

## 2022-10-29 PROBLEM — E87.20 METABOLIC ACIDOSIS: Status: RESOLVED | Noted: 2022-10-27 | Resolved: 2022-10-29

## 2022-10-29 LAB
ALBUMIN SERPL BCP-MCNC: 3.2 G/DL (ref 3.5–5.2)
ALP SERPL-CCNC: 51 U/L (ref 55–135)
ALT SERPL W/O P-5'-P-CCNC: 11 U/L (ref 10–44)
ANION GAP SERPL CALC-SCNC: 14 MMOL/L (ref 8–16)
AST SERPL-CCNC: 22 U/L (ref 10–40)
BASOPHILS # BLD AUTO: 0.01 K/UL (ref 0–0.2)
BASOPHILS NFR BLD: 0.2 % (ref 0–1.9)
BILIRUB SERPL-MCNC: 0.4 MG/DL (ref 0.1–1)
BUN SERPL-MCNC: 6 MG/DL (ref 6–20)
CALCIUM SERPL-MCNC: 7.5 MG/DL (ref 8.7–10.5)
CHLORIDE SERPL-SCNC: 102 MMOL/L (ref 95–110)
CO2 SERPL-SCNC: 24 MMOL/L (ref 23–29)
CREAT SERPL-MCNC: 0.7 MG/DL (ref 0.5–1.4)
DIFFERENTIAL METHOD: ABNORMAL
EOSINOPHIL # BLD AUTO: 0 K/UL (ref 0–0.5)
EOSINOPHIL NFR BLD: 0.2 % (ref 0–8)
ERYTHROCYTE [DISTWIDTH] IN BLOOD BY AUTOMATED COUNT: 17.6 % (ref 11.5–14.5)
EST. GFR  (NO RACE VARIABLE): >60 ML/MIN/1.73 M^2
GLUCOSE SERPL-MCNC: 140 MG/DL (ref 70–110)
HCT VFR BLD AUTO: 30.1 % (ref 37–48.5)
HGB BLD-MCNC: 10.1 G/DL (ref 12–16)
IMM GRANULOCYTES # BLD AUTO: 0.08 K/UL (ref 0–0.04)
IMM GRANULOCYTES NFR BLD AUTO: 1.5 % (ref 0–0.5)
LYMPHOCYTES # BLD AUTO: 1.3 K/UL (ref 1–4.8)
LYMPHOCYTES NFR BLD: 23.5 % (ref 18–48)
MAGNESIUM SERPL-MCNC: 1.6 MG/DL (ref 1.6–2.6)
MCH RBC QN AUTO: 36.1 PG (ref 27–31)
MCHC RBC AUTO-ENTMCNC: 33.6 G/DL (ref 32–36)
MCV RBC AUTO: 108 FL (ref 82–98)
MONOCYTES # BLD AUTO: 0.5 K/UL (ref 0.3–1)
MONOCYTES NFR BLD: 9.1 % (ref 4–15)
NEUTROPHILS # BLD AUTO: 3.5 K/UL (ref 1.8–7.7)
NEUTROPHILS NFR BLD: 65.5 % (ref 38–73)
NRBC BLD-RTO: 1 /100 WBC
PHOSPHATE SERPL-MCNC: 2.8 MG/DL (ref 2.7–4.5)
PLATELET # BLD AUTO: 213 K/UL (ref 150–450)
PMV BLD AUTO: 9.8 FL (ref 9.2–12.9)
POTASSIUM SERPL-SCNC: 2.3 MMOL/L (ref 3.5–5.1)
PROT SERPL-MCNC: 5.8 G/DL (ref 6–8.4)
RBC # BLD AUTO: 2.8 M/UL (ref 4–5.4)
SODIUM SERPL-SCNC: 140 MMOL/L (ref 136–145)
TROPONIN I SERPL DL<=0.01 NG/ML-MCNC: 0.01 NG/ML (ref 0–0.03)
WBC # BLD AUTO: 5.36 K/UL (ref 3.9–12.7)
WBC #/AREA STL HPF: NORMAL /[HPF]

## 2022-10-29 PROCEDURE — 99223 1ST HOSP IP/OBS HIGH 75: CPT | Mod: ,,, | Performed by: INTERNAL MEDICINE

## 2022-10-29 PROCEDURE — 93010 EKG 12-LEAD: ICD-10-PCS | Mod: ,,, | Performed by: INTERNAL MEDICINE

## 2022-10-29 PROCEDURE — 63600175 PHARM REV CODE 636 W HCPCS: Performed by: INTERNAL MEDICINE

## 2022-10-29 PROCEDURE — 85025 COMPLETE CBC W/AUTO DIFF WBC: CPT | Performed by: INTERNAL MEDICINE

## 2022-10-29 PROCEDURE — 36415 COLL VENOUS BLD VENIPUNCTURE: CPT | Performed by: NURSE PRACTITIONER

## 2022-10-29 PROCEDURE — 93010 ELECTROCARDIOGRAM REPORT: CPT | Mod: ,,, | Performed by: INTERNAL MEDICINE

## 2022-10-29 PROCEDURE — 96376 TX/PRO/DX INJ SAME DRUG ADON: CPT

## 2022-10-29 PROCEDURE — 63600175 PHARM REV CODE 636 W HCPCS: Performed by: NURSE PRACTITIONER

## 2022-10-29 PROCEDURE — 84484 ASSAY OF TROPONIN QUANT: CPT | Performed by: NURSE PRACTITIONER

## 2022-10-29 PROCEDURE — 25000242 PHARM REV CODE 250 ALT 637 W/ HCPCS: Performed by: INTERNAL MEDICINE

## 2022-10-29 PROCEDURE — 96365 THER/PROPH/DIAG IV INF INIT: CPT | Mod: 59

## 2022-10-29 PROCEDURE — G0378 HOSPITAL OBSERVATION PER HR: HCPCS

## 2022-10-29 PROCEDURE — 94761 N-INVAS EAR/PLS OXIMETRY MLT: CPT

## 2022-10-29 PROCEDURE — 96366 THER/PROPH/DIAG IV INF ADDON: CPT

## 2022-10-29 PROCEDURE — 94640 AIRWAY INHALATION TREATMENT: CPT

## 2022-10-29 PROCEDURE — 25000003 PHARM REV CODE 250: Performed by: NURSE PRACTITIONER

## 2022-10-29 PROCEDURE — 25000242 PHARM REV CODE 250 ALT 637 W/ HCPCS: Performed by: NURSE PRACTITIONER

## 2022-10-29 PROCEDURE — 99223 PR INITIAL HOSPITAL CARE,LEVL III: ICD-10-PCS | Mod: ,,, | Performed by: INTERNAL MEDICINE

## 2022-10-29 PROCEDURE — 25000003 PHARM REV CODE 250: Performed by: INTERNAL MEDICINE

## 2022-10-29 PROCEDURE — 21400001 HC TELEMETRY ROOM

## 2022-10-29 PROCEDURE — 84100 ASSAY OF PHOSPHORUS: CPT | Performed by: INTERNAL MEDICINE

## 2022-10-29 PROCEDURE — 83735 ASSAY OF MAGNESIUM: CPT | Performed by: INTERNAL MEDICINE

## 2022-10-29 PROCEDURE — 36415 COLL VENOUS BLD VENIPUNCTURE: CPT | Performed by: INTERNAL MEDICINE

## 2022-10-29 PROCEDURE — 93005 ELECTROCARDIOGRAM TRACING: CPT

## 2022-10-29 PROCEDURE — C9113 INJ PANTOPRAZOLE SODIUM, VIA: HCPCS | Performed by: NURSE PRACTITIONER

## 2022-10-29 PROCEDURE — 80053 COMPREHEN METABOLIC PANEL: CPT | Performed by: INTERNAL MEDICINE

## 2022-10-29 PROCEDURE — 27000207 HC ISOLATION

## 2022-10-29 RX ORDER — POTASSIUM CHLORIDE 20 MEQ/1
40 TABLET, EXTENDED RELEASE ORAL
Status: COMPLETED | OUTPATIENT
Start: 2022-10-29 | End: 2022-10-29

## 2022-10-29 RX ORDER — IPRATROPIUM BROMIDE AND ALBUTEROL SULFATE 2.5; .5 MG/3ML; MG/3ML
3 SOLUTION RESPIRATORY (INHALATION) EVERY 4 HOURS
Status: DISCONTINUED | OUTPATIENT
Start: 2022-10-29 | End: 2022-10-31 | Stop reason: HOSPADM

## 2022-10-29 RX ORDER — MAGNESIUM SULFATE 1 G/100ML
1 INJECTION INTRAVENOUS ONCE
Status: COMPLETED | OUTPATIENT
Start: 2022-10-29 | End: 2022-10-29

## 2022-10-29 RX ADMIN — METRONIDAZOLE 500 MG: 500 TABLET ORAL at 05:10

## 2022-10-29 RX ADMIN — AMLODIPINE BESYLATE 5 MG: 5 TABLET ORAL at 10:10

## 2022-10-29 RX ADMIN — MAGNESIUM SULFATE 1 G: 1 INJECTION INTRAVENOUS at 12:10

## 2022-10-29 RX ADMIN — IPRATROPIUM BROMIDE AND ALBUTEROL SULFATE 3 ML: 2.5; .5 SOLUTION RESPIRATORY (INHALATION) at 11:10

## 2022-10-29 RX ADMIN — METHYLPREDNISOLONE SODIUM SUCCINATE 40 MG: 40 INJECTION, POWDER, FOR SOLUTION INTRAMUSCULAR; INTRAVENOUS at 05:10

## 2022-10-29 RX ADMIN — Medication 6 MG: at 10:10

## 2022-10-29 RX ADMIN — HYDROCODONE BITARTRATE AND ACETAMINOPHEN 1 TABLET: 10; 325 TABLET ORAL at 10:10

## 2022-10-29 RX ADMIN — PANTOPRAZOLE SODIUM 40 MG: 40 INJECTION, POWDER, FOR SOLUTION INTRAVENOUS at 10:10

## 2022-10-29 RX ADMIN — IPRATROPIUM BROMIDE AND ALBUTEROL SULFATE 3 ML: 2.5; .5 SOLUTION RESPIRATORY (INHALATION) at 04:10

## 2022-10-29 RX ADMIN — OSELTAMIVIR PHOSPHATE 75 MG: 75 CAPSULE ORAL at 10:10

## 2022-10-29 RX ADMIN — SODIUM BICARBONATE 650 MG: 650 TABLET ORAL at 10:10

## 2022-10-29 RX ADMIN — CIPROFLOXACIN 400 MG: 2 INJECTION, SOLUTION INTRAVENOUS at 04:10

## 2022-10-29 RX ADMIN — IPRATROPIUM BROMIDE AND ALBUTEROL SULFATE 3 ML: 2.5; .5 SOLUTION RESPIRATORY (INHALATION) at 08:10

## 2022-10-29 RX ADMIN — METRONIDAZOLE 500 MG: 500 TABLET ORAL at 02:10

## 2022-10-29 RX ADMIN — LISINOPRIL 10 MG: 10 TABLET ORAL at 10:10

## 2022-10-29 RX ADMIN — METRONIDAZOLE 500 MG: 500 TABLET ORAL at 10:10

## 2022-10-29 RX ADMIN — POTASSIUM CHLORIDE 40 MEQ: 1500 TABLET, EXTENDED RELEASE ORAL at 04:10

## 2022-10-29 RX ADMIN — SODIUM BICARBONATE 650 MG: 650 TABLET ORAL at 02:10

## 2022-10-29 RX ADMIN — BUDESONIDE 0.5 MG: 0.5 INHALANT ORAL at 08:10

## 2022-10-29 RX ADMIN — POTASSIUM CHLORIDE 40 MEQ: 1500 TABLET, EXTENDED RELEASE ORAL at 02:10

## 2022-10-29 RX ADMIN — METHYLPREDNISOLONE SODIUM SUCCINATE 40 MG: 40 INJECTION, POWDER, FOR SOLUTION INTRAMUSCULAR; INTRAVENOUS at 11:10

## 2022-10-29 RX ADMIN — METHYLPREDNISOLONE SODIUM SUCCINATE 40 MG: 40 INJECTION, POWDER, FOR SOLUTION INTRAMUSCULAR; INTRAVENOUS at 12:10

## 2022-10-29 RX ADMIN — BUDESONIDE 0.5 MG: 0.5 INHALANT ORAL at 07:10

## 2022-10-29 RX ADMIN — CIPROFLOXACIN 400 MG: 2 INJECTION, SOLUTION INTRAVENOUS at 03:10

## 2022-10-29 RX ADMIN — IPRATROPIUM BROMIDE AND ALBUTEROL SULFATE 3 ML: 2.5; .5 SOLUTION RESPIRATORY (INHALATION) at 07:10

## 2022-10-29 RX ADMIN — POTASSIUM CHLORIDE 40 MEQ: 1500 TABLET, EXTENDED RELEASE ORAL at 05:10

## 2022-10-29 RX ADMIN — POTASSIUM CHLORIDE 40 MEQ: 1500 TABLET, EXTENDED RELEASE ORAL at 12:10

## 2022-10-29 RX ADMIN — IPRATROPIUM BROMIDE AND ALBUTEROL SULFATE 3 ML: 2.5; .5 SOLUTION RESPIRATORY (INHALATION) at 12:10

## 2022-10-29 RX ADMIN — CETIRIZINE HYDROCHLORIDE 10 MG: 10 TABLET, FILM COATED ORAL at 10:10

## 2022-10-29 NOTE — HPI
"Lucero Hi Kristian is 61 y.o.  Asked to see for persistent wheezing  Hx Asthma:on Albuterol  Smoker 1 PPD  Cough, Congestion:   +ve for influenza A  Also see ER [facility] 10/25 similar symptoms with abd pain: Colitis" Cipro + Flagyl given       "

## 2022-10-29 NOTE — NURSING
Patient is on tele monitor, NSR. Patient is able to ambulate to restroom independently. Patient Is receiving IV antibiotics. Patient breathing has improved, any denies any worsening symptoms. Patient has not had any bloody stool. Patient is showing no signs of distress. No new orders at this time.

## 2022-10-29 NOTE — ASSESSMENT & PLAN NOTE
- Due to vomiting and diarrhea in the setting of gastroenteritis   -Replete as indicated     Patient denies any further vomiting or diarrhea at this time   K+2.3, will replete

## 2022-10-29 NOTE — HOSPITAL COURSE
10/30: Seen and examined, wheezing slightly better, Chest CT: no acute abn, K 3.2, Mg 1.5, T max 99F, cultures -ve, still has abd complaints anticipated coloscop?

## 2022-10-29 NOTE — PROGRESS NOTES
[facility] Medicine  Progress Note    Patient Name: Lucero Townsend  MRN: [id number]  Patient Class: IP- Inpatient   Admission Date: 10/27/2022  Length of Stay: 0 days  Attending Physician: Juan Gasca, *  Primary Care Provider: Primary Doctor No        Subjective:     Principal Problem:Sepsis        HPI:  The pt is a 60 yo female with PMHx significant for Asthma and tobacco abuse presented to the ED for evaluation of 4 days h/o cough, chest congestion,SOB, wheezing,  fever, chills, abdominal pain and cramp across the lower abd , nausea , vomiting and multiple episodes of diarrhea throughout the day with dark, loose stools. Denies chest pain, Pt was seen at [facility] on 10/25/22 for similar symptoms and diagnosed with colitis and asthma  and was discharged  home on oral Cipro /Flagyl and medrol dosepak. Pt returns here today and reports symptoms did not improve and now can't keep food down due to vomiting and still having diarrhea. Additionally pt is noted to have positive Influenza A. Unsure of sick contact and no recent travel. She is a current everyday smoker , denies drinking alcohol. Vitals on presentation - 140/90, P-93, RR-30, SpO2 99% on 3 L, T-98.9. Labs - WBC 9.1, K 2.3, CO2 17, Cr 0.9, Lipase normal, LA 5.8, BNP 60, Troponin 0.009, PCT 0.05, CXR - clear lungs, CT abd/pelvis - mild thickening of the transverse and descending colon suggestive of colitis. Pt received 30ml/kg bolus fluid, Cipro, Flagyl , Solumedrol 125 mg in the ED and  consulted for admission.     Pt will be placed in observation under  service for sepsis due to colitis and influenza, Severe hypokalemia due to gastroenteritis and vomiting, and Metabolic acidosis/ Lactic acidosis.       Overview/Hospital Course:  Patient admitted for sepsis due to gastroenteritis and colitis and started on IV cipro/flagyl. Stool studies pending. GI consult. Serum potassium 2.6 and magnesium 1.0. Will replete. Blood  pressure elevated will start lisinopril. 10/29/22: Wheezing continued despite treatment. Pulmonology consult. Continues with diarrhea. CMP pending, will replace electrolytes as needed. Patient tolerating diet.           Review of Systems   Constitutional:  Positive for activity change, appetite change and fatigue. Negative for chills and fever.   HENT:  Negative for sore throat.    Eyes:  Negative for visual disturbance.   Respiratory:  Positive for cough and shortness of breath. Negative for chest tightness.    Cardiovascular:  Negative for chest pain, palpitations and leg swelling.   Gastrointestinal:  Positive for abdominal pain, diarrhea and nausea. Negative for abdominal distention, constipation and vomiting.   Endocrine: Negative for polyuria.   Genitourinary:  Negative for decreased urine volume, dysuria, flank pain, frequency and hematuria.   Musculoskeletal:  Negative for back pain and gait problem.   Skin:  Negative for rash.   Neurological:  Negative for syncope, speech difficulty, weakness, light-headedness and headaches.   Psychiatric/Behavioral:  Negative for confusion, hallucinations and sleep disturbance.    Objective:     Vital Signs (Most Recent):  Temp: 97.4 °F (36.3 °C) (10/29/22 0742)  Pulse: 60 (10/29/22 0850)  Resp: 18 (10/29/22 0850)  BP: (!) 148/69 (10/29/22 0742)  SpO2: 96 % (10/29/22 0850)   Vital Signs (24h Range):  Temp:  [97.4 °F (36.3 °C)-99.3 °F (37.4 °C)] 97.4 °F (36.3 °C)  Pulse:  [] 60  Resp:  [16-22] 18  SpO2:  [94 %-98 %] 96 %  BP: (138-185)/(69-91) 148/69     Weight: 101.3 kg (223 lb 5.2 oz)  Body mass index is 32.98 kg/m².    Intake/Output Summary (Last 24 hours) at 10/29/2022 1108  Last data filed at 10/28/2022 1856  Gross per 24 hour   Intake 1302.2 ml   Output --   Net 1302.2 ml      Physical Exam  Constitutional:       General: She is not in acute distress.     Appearance: She is well-developed. She is ill-appearing. She is not diaphoretic.   HENT:      Head:  Normocephalic and atraumatic.      Mouth/Throat:      Mouth: Mucous membranes are dry.      Pharynx: No oropharyngeal exudate.   Eyes:      Conjunctiva/sclera: Conjunctivae normal.      Pupils: Pupils are equal, round, and reactive to light.   Neck:      Thyroid: No thyromegaly.      Vascular: No JVD.   Cardiovascular:      Rate and Rhythm: Normal rate and regular rhythm.      Heart sounds: Normal heart sounds. No murmur heard.  Pulmonary:      Effort: Pulmonary effort is normal. No respiratory distress.      Breath sounds: Wheezing present. No rhonchi or rales.   Chest:      Chest wall: No tenderness.   Abdominal:      General: Bowel sounds are normal. There is no distension.      Palpations: Abdomen is soft.      Tenderness: There is abdominal tenderness (generalized, worse in the lower). There is no guarding or rebound.   Musculoskeletal:         General: Normal range of motion.      Cervical back: Normal range of motion and neck supple.      Right lower leg: No edema.      Left lower leg: No edema.   Lymphadenopathy:      Cervical: No cervical adenopathy.   Skin:     General: Skin is warm and dry.      Findings: No rash.   Neurological:      General: No focal deficit present.      Mental Status: She is alert and oriented to person, place, and time.      Cranial Nerves: No cranial nerve deficit.      Sensory: No sensory deficit.      Deep Tendon Reflexes: Reflexes normal.   Psychiatric:         Mood and Affect: Mood normal.       Significant Labs: All pertinent labs within the past 24 hours have been reviewed.  Blood Culture:   Recent Labs   Lab 10/27/22  1252 10/27/22  1301   LABBLOO No Growth to date  No Growth to date No Growth to date  No Growth to date     BMP:   Recent Labs   Lab 10/29/22  0547   *      K 2.3*      CO2 24   BUN 6   CREATININE 0.7   CALCIUM 7.5*   MG 1.6     CBC:   Recent Labs   Lab 10/28/22  0501 10/28/22  1644 10/29/22  0547   WBC 6.32 5.24 5.36   HGB 9.2* 11.2* 10.1*    HCT 26.6* 32.4* 30.1*    238 213     CMP:   Recent Labs   Lab 10/28/22  0011 10/28/22  0501 10/29/22  0547    139 140   K 2.5* 2.6* 2.3*    103 102   CO2 19* 22* 24   * 184* 140*   BUN 6 6 6   CREATININE 0.8 0.8 0.7   CALCIUM 7.4* 7.2* 7.5*   PROT 6.1 5.7* 5.8*   ALBUMIN 3.4* 3.2* 3.2*   BILITOT 0.4 0.5 0.4   ALKPHOS 57 59 51*   AST 19 17 22   ALT 9* 9* 11   ANIONGAP 16 14 14       Significant Imaging: I have reviewed all pertinent imaging results/findings within the past 24 hours.      Assessment/Plan:      * Sepsis  Pt meets two SIRS criteria HR>90, RR >20     This patient does have evidence of infective focus  My overall impression is sepsis. Vital signs were reviewed and noted in progress note.  Antibiotics given-   Antibiotics (From admission, onward)      Start     Stop Route Frequency Ordered    10/27/22 2200  metroNIDAZOLE tablet 500 mg         -- Oral Every 8 hours 10/27/22 1454    10/27/22 1600  ciprofloxacin (CIPRO)400mg/200ml D5W IVPB 400 mg         -- IV Every 12 hours (non-standard times) 10/27/22 1454          Cultures were taken-   Microbiology Results (last 7 days)       Procedure Component Value Units Date/Time    Blood Culture #1 **CANNOT BE ORDERED STAT** [[id number]] Collected: 10/27/22 1252    Order Status: Completed Specimen: Blood from Peripheral, Antecubital, Left Updated: 10/29/22 0616     Blood Culture, Routine No Growth to date      No Growth to date    Blood Culture #2 **CANNOT BE ORDERED STAT** [[id number]] Collected: 10/27/22 1301    Order Status: Completed Specimen: Blood Updated: 10/29/22 0616     Blood Culture, Routine No Growth to date      No Growth to date    Stool culture [[id number]] Collected: 10/28/22 0345    Order Status: Sent Specimen: Stool Updated: 10/28/22 1657    E. coli 0157 antigen [[id number]] Collected: 10/28/22 0345    Order Status: No result Specimen: Stool Updated: 10/28/22 1657    Clostridium difficile EIA [[id number]] Collected: 10/28/22  0345    Order Status: Completed Specimen: Stool Updated: 10/28/22 0926     C. diff Antigen Negative     C difficile Toxins A+B, EIA Negative     Comment: Testing not recommended for children <24 months old.             Latest lactate reviewed, they are-  Recent Labs   Lab 10/27/22  1926 10/28/22  0011 10/28/22  0501   LACTATE 9.6* 4.9* 3.9*       Organ dysfunction indicated by none  Source- GI tract , Respiratory tract     Source control Achieved by-     Antiviral   Empiric antibacterial targeting intra abdominal infection     WBCs remain normal   Afebrile           Obesity (BMI 30-39.9)  Body mass index is 32.98 kg/m². Morbid obesity complicates all aspects of disease management from diagnostic modalities to treatment. Weight loss encouraged and health benefits explained to patient.         Current every day smoker  Assistance with smoking cessation was offered, including:  []  Medications  [x]  Counseling  []  Printed Information on Smoking Cessation  []  Referral to a Smoking Cessation Program    Patient was counseled regarding smoking for 3-10 minutes.          Macrocytic anemia  - Denies h/o alcohol use   -H/H stable    -iron study , B12, folic acid level pending       Hypokalemia due to excessive gastrointestinal loss of potassium  - Due to vomiting and diarrhea in the setting of gastroenteritis   -Replete as indicated     Patient continues to have diarrhea  K+2.3, will replete     Asthma with acute exacerbation  - SKYE, Budesonide, IV steroids  -Pulmonology consult      Influenza A infection   - Oseltamivir 75 mg bid x 5 days   -Support care     Nausea & vomiting  -Improved   - Anti emetics as needed       Gastroenteritis and colitis  - Likely viral. Get stool study   -Empiric antibiotic treatment targeting intra abdominal infection with Cipro and Flagyl   -Fluid resuscitation   -Electrolyte replacement   -Other supportive treatment     10/29/22  -Continue abx therapy   -Follow stool studies   -Tolerating  diet       VTE Risk Mitigation (From admission, onward)           Ordered     Place sequential compression device  Until discontinued         10/27/22 1650     IP VTE LOW RISK PATIENT  Once         10/27/22 1650                    Discharge Planning   MANAV:      Code Status: Full Code   Is the patient medically ready for discharge?:     Reason for patient still in hospital (select all that apply): Patient trending condition, Laboratory test and Treatment  Discharge Plan A: Home, Home with family                  Bailee Rebolledo NP  Department of Hospital Medicine   '[place] - Telemetry ([facility])

## 2022-10-29 NOTE — ASSESSMENT & PLAN NOTE
Pt meets two SIRS criteria HR>90, RR >20     This patient does have evidence of infective focus  My overall impression is sepsis. Vital signs were reviewed and noted in progress note.  Antibiotics given-   Antibiotics (From admission, onward)    Start     Stop Route Frequency Ordered    10/27/22 2200  metroNIDAZOLE tablet 500 mg         -- Oral Every 8 hours 10/27/22 1454    10/27/22 1600  ciprofloxacin (CIPRO)400mg/200ml D5W IVPB 400 mg         -- IV Every 12 hours (non-standard times) 10/27/22 1454        Cultures were taken-   Microbiology Results (last 7 days)     Procedure Component Value Units Date/Time    Blood Culture #1 **CANNOT BE ORDERED STAT** [[id number]] Collected: 10/27/22 1252    Order Status: Completed Specimen: Blood from Peripheral, Antecubital, Left Updated: 10/29/22 0616     Blood Culture, Routine No Growth to date      No Growth to date    Blood Culture #2 **CANNOT BE ORDERED STAT** [[id number]] Collected: 10/27/22 1301    Order Status: Completed Specimen: Blood Updated: 10/29/22 0616     Blood Culture, Routine No Growth to date      No Growth to date    Stool culture [[id number]] Collected: 10/28/22 0345    Order Status: Sent Specimen: Stool Updated: 10/28/22 1657    E. coli 0157 antigen [[id number]] Collected: 10/28/22 0345    Order Status: No result Specimen: Stool Updated: 10/28/22 1657    Clostridium difficile EIA [[id number]] Collected: 10/28/22 0345    Order Status: Completed Specimen: Stool Updated: 10/28/22 0926     C. diff Antigen Negative     C difficile Toxins A+B, EIA Negative     Comment: Testing not recommended for children <24 months old.           Latest lactate reviewed, they are-  Recent Labs   Lab 10/27/22  1926 10/28/22  0011 10/28/22  0501   LACTATE 9.6* 4.9* 3.9*       Organ dysfunction indicated by none  Source- GI tract , Respiratory tract     Source control Achieved by-     Antiviral   Empiric antibacterial targeting intra abdominal infection     WBCs remain normal    Afebrile

## 2022-10-29 NOTE — PROGRESS NOTES
Ms. Otero was seen this morning and her abdominal pain has not changed much and she does not report any hematochezia.  He still has pain with eating.      No leukocytosis still and lactate has improved.  Abdominal examination still shows tenderness on superficial palpation.  C diff testing is negative and stool cultures are pending.  Stool WBC showed no neutrophils.    If no improvement by tomorrow we will plan for colonoscopy to evaluate this colitis seen and assess for inflammatory bowel disease, ischemic colitis, infectious colitis.  This was  discussed with the patient.    Gautam Pattesron MD  Gastroenterology  Director of Advanced Endoscopy at [facility]

## 2022-10-29 NOTE — ASSESSMENT & PLAN NOTE
- Likely viral. Get stool study   -Empiric antibiotic treatment targeting intra abdominal infection with Cipro and Flagyl   -Fluid resuscitation   -Electrolyte replacement   -Other supportive treatment     10/29/22  -Continue abx therapy   -Follow stool studies   -Tolerating diet

## 2022-10-29 NOTE — SUBJECTIVE & OBJECTIVE
Review of Systems   Constitutional:  Positive for activity change, appetite change and fatigue. Negative for chills and fever.   HENT:  Negative for sore throat.    Eyes:  Negative for visual disturbance.   Respiratory:  Positive for cough and shortness of breath. Negative for chest tightness.    Cardiovascular:  Negative for chest pain, palpitations and leg swelling.   Gastrointestinal:  Positive for abdominal pain, diarrhea and nausea. Negative for abdominal distention, constipation and vomiting.   Endocrine: Negative for polyuria.   Genitourinary:  Negative for decreased urine volume, dysuria, flank pain, frequency and hematuria.   Musculoskeletal:  Negative for back pain and gait problem.   Skin:  Negative for rash.   Neurological:  Negative for syncope, speech difficulty, weakness, light-headedness and headaches.   Psychiatric/Behavioral:  Negative for confusion, hallucinations and sleep disturbance.    Objective:     Vital Signs (Most Recent):  Temp: 97.4 °F (36.3 °C) (10/29/22 0742)  Pulse: 60 (10/29/22 0850)  Resp: 18 (10/29/22 0850)  BP: (!) 148/69 (10/29/22 0742)  SpO2: 96 % (10/29/22 0850)   Vital Signs (24h Range):  Temp:  [97.4 °F (36.3 °C)-99.3 °F (37.4 °C)] 97.4 °F (36.3 °C)  Pulse:  [] 60  Resp:  [16-22] 18  SpO2:  [94 %-98 %] 96 %  BP: (138-185)/(69-91) 148/69     Weight: 101.3 kg (223 lb 5.2 oz)  Body mass index is 32.98 kg/m².    Intake/Output Summary (Last 24 hours) at 10/29/2022 1108  Last data filed at 10/28/2022 1856  Gross per 24 hour   Intake 1302.2 ml   Output --   Net 1302.2 ml      Physical Exam  Constitutional:       General: She is not in acute distress.     Appearance: She is well-developed. She is ill-appearing. She is not diaphoretic.   HENT:      Head: Normocephalic and atraumatic.      Mouth/Throat:      Mouth: Mucous membranes are dry.      Pharynx: No oropharyngeal exudate.   Eyes:      Conjunctiva/sclera: Conjunctivae normal.      Pupils: Pupils are equal, round, and  reactive to light.   Neck:      Thyroid: No thyromegaly.      Vascular: No JVD.   Cardiovascular:      Rate and Rhythm: Normal rate and regular rhythm.      Heart sounds: Normal heart sounds. No murmur heard.  Pulmonary:      Effort: Pulmonary effort is normal. No respiratory distress.      Breath sounds: Wheezing present. No rhonchi or rales.   Chest:      Chest wall: No tenderness.   Abdominal:      General: Bowel sounds are normal. There is no distension.      Palpations: Abdomen is soft.      Tenderness: There is abdominal tenderness (generalized, worse in the lower). There is no guarding or rebound.   Musculoskeletal:         General: Normal range of motion.      Cervical back: Normal range of motion and neck supple.      Right lower leg: No edema.      Left lower leg: No edema.   Lymphadenopathy:      Cervical: No cervical adenopathy.   Skin:     General: Skin is warm and dry.      Findings: No rash.   Neurological:      General: No focal deficit present.      Mental Status: She is alert and oriented to person, place, and time.      Cranial Nerves: No cranial nerve deficit.      Sensory: No sensory deficit.      Deep Tendon Reflexes: Reflexes normal.   Psychiatric:         Mood and Affect: Mood normal.       Significant Labs: All pertinent labs within the past 24 hours have been reviewed.  Blood Culture:   Recent Labs   Lab 10/27/22  1252 10/27/22  1301   LABBLOO No Growth to date  No Growth to date No Growth to date  No Growth to date     BMP:   Recent Labs   Lab 10/29/22  0547   *      K 2.3*      CO2 24   BUN 6   CREATININE 0.7   CALCIUM 7.5*   MG 1.6     CBC:   Recent Labs   Lab 10/28/22  0501 10/28/22  1644 10/29/22  0547   WBC 6.32 5.24 5.36   HGB 9.2* 11.2* 10.1*   HCT 26.6* 32.4* 30.1*    238 213     CMP:   Recent Labs   Lab 10/28/22  0011 10/28/22  0501 10/29/22  0547    139 140   K 2.5* 2.6* 2.3*    103 102   CO2 19* 22* 24   * 184* 140*   BUN 6 6 6    CREATININE 0.8 0.8 0.7   CALCIUM 7.4* 7.2* 7.5*   PROT 6.1 5.7* 5.8*   ALBUMIN 3.4* 3.2* 3.2*   BILITOT 0.4 0.5 0.4   ALKPHOS 57 59 51*   AST 19 17 22   ALT 9* 9* 11   ANIONGAP 16 14 14       Significant Imaging: I have reviewed all pertinent imaging results/findings within the past 24 hours.

## 2022-10-29 NOTE — SUBJECTIVE & OBJECTIVE
Past Medical History:   Diagnosis Date    Asthma        History reviewed. No pertinent surgical history.    Review of patient's allergies indicates:  No Known Allergies    Family History       Problem Relation (Age of Onset)    Cancer Mother    Diabetes Mother, Father          Tobacco Use    Smoking status: Every Day     Packs/day: 1.00     Years: 30.00     Pack years: 30.00     Types: Cigarettes    Smokeless tobacco: Never   Substance and Sexual Activity    Alcohol use: Not on file     Comment: occ    Drug use: Never    Sexual activity: Not on file         Review of Systems   Constitutional:  Positive for fatigue.   Respiratory:  Positive for cough and wheezing.    All other systems reviewed and are negative.  Objective:     Vital Signs (Most Recent):  Temp: 97.8 °F (36.6 °C) (10/29/22 1154)  Pulse: 64 (10/29/22 1155)  Resp: (!) 22 (10/29/22 1155)  BP: (!) 152/84 (10/29/22 1154)  SpO2: (!) 94 % (10/29/22 1155)   Vital Signs (24h Range):  Temp:  [97.4 °F (36.3 °C)-98.7 °F (37.1 °C)] 97.8 °F (36.6 °C)  Pulse:  [] 64  Resp:  [16-22] 22  SpO2:  [93 %-98 %] 94 %  BP: (138-171)/(69-91) 152/84     Weight: 101.3 kg (223 lb 5.2 oz)  Body mass index is 32.98 kg/m².      Intake/Output Summary (Last 24 hours) at 10/29/2022 1243  Last data filed at 10/28/2022 1856  Gross per 24 hour   Intake 1302.2 ml   Output --   Net 1302.2 ml       Physical Exam  Vitals and nursing note reviewed.   Constitutional:       Appearance: She is well-developed.   HENT:      Head: Normocephalic and atraumatic.      Nose: Nose normal.      Mouth/Throat:      Pharynx: No oropharyngeal exudate.   Eyes:      General: No scleral icterus.     Conjunctiva/sclera: Conjunctivae normal.      Pupils: Pupils are equal, round, and reactive to light.   Neck:      Thyroid: No thyromegaly.      Vascular: No JVD.      Trachea: No tracheal deviation.   Cardiovascular:      Rate and Rhythm: Normal rate and regular rhythm.      Heart sounds: Normal heart sounds,  S1 normal and S2 normal. No murmur heard.  Pulmonary:      Effort: Pulmonary effort is normal. No tachypnea, accessory muscle usage or respiratory distress.      Breath sounds: No stridor. Wheezing present.   Abdominal:      General: Bowel sounds are normal. There is no distension.      Palpations: Abdomen is soft. There is no hepatomegaly, splenomegaly or mass.      Tenderness: There is no abdominal tenderness. There is no guarding or rebound.   Musculoskeletal:         General: No tenderness. Normal range of motion.      Cervical back: Normal range of motion and neck supple.   Lymphadenopathy:      Upper Body:      Right upper body: No supraclavicular adenopathy.      Left upper body: No supraclavicular adenopathy.   Skin:     General: Skin is warm and dry.      Findings: No rash.      Nails: There is no clubbing.   Neurological:      Mental Status: She is alert and oriented to person, place, and time.      Coordination: Coordination normal.      Gait: Gait normal.      Deep Tendon Reflexes: Reflexes are normal and symmetric.       Vents:  Oxygen Concentration (%): 32 (10/29/22 0011)    Lines/Drains/Airways       Peripheral Intravenous Line  Duration                  Peripheral IV - Single Lumen 10/27/22 2034 20 G Anterior;Right Forearm 1 day         Peripheral IV - Single Lumen 10/27/22 2034 22 G Posterior;Right Forearm 1 day                    Significant Labs:    CBC/Anemia Profile:  Recent Labs   Lab 10/28/22  0501 10/28/22  1644 10/29/22  0547   WBC 6.32 5.24 5.36   HGB 9.2* 11.2* 10.1*   HCT 26.6* 32.4* 30.1*    238 213   * 104* 108*   RDW 16.9* 17.0* 17.6*        Chemistries:  Recent Labs   Lab 10/28/22  0011 10/28/22  0501 10/29/22  0547    139 140   K 2.5* 2.6* 2.3*    103 102   CO2 19* 22* 24   BUN 6 6 6   CREATININE 0.8 0.8 0.7   CALCIUM 7.4* 7.2* 7.5*   ALBUMIN 3.4* 3.2* 3.2*   PROT 6.1 5.7* 5.8*   BILITOT 0.4 0.5 0.4   ALKPHOS 57 59 51*   ALT 9* 9* 11   AST 19 17 22   MG 0.8*  1.0* 1.6   PHOS  --  2.5* 2.8       ABGs: No results for input(s): PH, PCO2, HCO3, POCSATURATED, BE in the last 48 hours.  POCT Glucose: No results for input(s): POCTGLUCOSE in the last 48 hours.  Recent Lab Results         10/29/22  0547   10/28/22  1644        Albumin 3.2         Alkaline Phosphatase 51         ALT 11         Anion Gap 14         AST 22         Baso # 0.01   0.00       Basophil % 0.2   0.0       BILIRUBIN TOTAL 0.4  Comment: For infants and newborns, interpretation of results should be based  on gestational age, weight and in agreement with clinical  observations.    Premature Infant recommended reference ranges:  Up to 24 hours.............<8.0 mg/dL  Up to 48 hours............<12.0 mg/dL  3-5 days..................<15.0 mg/dL  6-29 days.................<15.0 mg/dL           BUN 6         Calcium 7.5         Chloride 102         CO2 24         Creatinine 0.7         Differential Method Automated   Automated       eGFR >60         Eos # 0.0   0.0       Eosinophil % 0.2   0.0       Glucose 140         Gran # (ANC) 3.5   4.4       Gran % 65.5   84.3       Hematocrit 30.1   32.4       Hemoglobin 10.1   11.2       Immature Grans (Abs) 0.08  Comment: Mild elevation in immature granulocytes is non specific and   can be seen in a variety of conditions including stress response,   acute inflammation, trauma and pregnancy. Correlation with other   laboratory and clinical findings is essential.     0.12  Comment: Mild elevation in immature granulocytes is non specific and   can be seen in a variety of conditions including stress response,   acute inflammation, trauma and pregnancy. Correlation with other   laboratory and clinical findings is essential.         Immature Granulocytes 1.5   2.3       Lymph # 1.3   0.4       Lymph % 23.5   8.4       Magnesium 1.6         MCH 36.1   35.9       MCHC 33.6   34.6          104       Mono # 0.5   0.3       Mono % 9.1   5.0       MPV 9.8   9.0       nRBC 1    2       Phosphorus 2.8         Platelets 213   238       Potassium 2.3  Comment: K  critical result(s) called and verbal readback obtained from LEIDA LADD RN  by RA2 10/29/2022 11:12           PROTEIN TOTAL 5.8         RBC 2.80   3.12       RDW 17.6   17.0       Sodium 140         WBC 5.36   5.24             All pertinent labs within the past 24 hours have been reviewed.    Significant Imaging:   I have reviewed all pertinent imaging results/findings within the past 24 hours.    CT Abdomen Pelvis With Contrast  Narrative: EXAMINATION:  CT ABDOMEN PELVIS WITH CONTRAST    CLINICAL HISTORY:  Abdominal abscess/infection suspected;colitis;    TECHNIQUE:  Low dose axial images, sagittal and coronal reformations were obtained from the lung bases to the pubic symphysis following the IV administration of 100 mL of Omnipaque 350.  Oral contrast was not administered.    COMPARISON:  None    FINDINGS:  Heart: Normal size. No effusion.    Lung Bases: Clear.    Liver: Normal size and attenuation. No focal lesions.    Gallbladder: No calcified gallstones.    Bile Ducts: No dilatation.    Pancreas: No mass. No peripancreatic fat stranding.    Spleen: Normal.    Adrenals: Normal.    Kidneys/Ureters: Normal enhancement.  Scarring seen throughout portions of the right kidney.  No mass or  hydroureteronephrosis.    Bladder: No wall thickening.    Reproductive organs: Right ovarian/adnexal cysts versus hydrosalpinx suspected.    GI Tract/Mesentery: No evidence of bowel obstruction..  No evidence of appendicitis.  Large bowel is collapsed which limits evaluation.  Mild thickening of the transverse and descending colon which could reflect mild infectious or inflammatory colitis.    Peritoneal Space: No ascites or free air.    Retroperitoneum: No significant adenopathy.    Abdominal wall: Small fat containing umbilical hernia.    Vasculature: No aneurysm. Aorta demonstrates atherosclerotic disease.    Bones: No acute fracture.   Moderate degenerative changes including facet arthropathy throughout the lower lumbar spine.  Remote left lateral transverse process fractures at L4 and L5.  No suspicious lytic or sclerotic lesions.  Impression: Mild thickening of the transverse and descending colon which could reflect mild infectious or inflammatory colitis.  No abdominal abscess identified    All CT scans at this facility use dose modulation, iterative reconstruction, and/or weight based dosing when appropriate to reduce radiation dose to as low as reasonable achievable.    Electronically signed by: Magdiel Richardson MD  Date:    10/27/2022  Time:    14:21  X-Ray Chest AP Portable  Narrative: EXAMINATION:  XR CHEST AP PORTABLE    CLINICAL HISTORY:  Chest pain;.    TECHNIQUE:  Single frontal portable view of the chest was performed.    COMPARISON:  None    FINDINGS:  Support devices: None    Calcified right hilar lymph node.The lungs are clear, with normal appearance of pulmonary vasculature and no pleural effusion or pneumothorax.    The cardiac silhouette is normal in size. The hilar and mediastinal contours are unremarkable.    Bones are intact.  Impression: No acute abnormality.    Electronically signed by: Joseluis Nj  Date:    10/27/2022  Time:    12:43

## 2022-10-29 NOTE — CONSULTS
"[facility] Tele[facility] ([facility])  Pulmonology  Consult Note    Patient Name: Lucero Townsend  MRN: [id number]  Admission Date: 10/27/2022  Hospital Length of Stay: 0 days  Code Status: Full Code  Attending Physician: Juan Gasca, *  Primary Care Provider: Primary Doctor No   Principal Problem: Sepsis    [unfilled]  Subjective:     HPI:  Lucero Townsend is 61 y.o.  Asked to see for persistent wheezing  Hx Asthma:on Albuterol  Smoker 1 PPD  Cough, Congestion:   +ve for influenza A  Also see ER [facility] 10/25 similar symptoms with abd pain: Colitis" Cipro + Flagyl given           Past Medical History:   Diagnosis Date    Asthma        History reviewed. No pertinent surgical history.    Review of patient's allergies indicates:  No Known Allergies    Family History       Problem Relation (Age of Onset)    Cancer Mother    Diabetes Mother, Father          Tobacco Use    Smoking status: Every Day     Packs/day: 1.00     Years: 30.00     Pack years: 30.00     Types: Cigarettes    Smokeless tobacco: Never   Substance and Sexual Activity    Alcohol use: Not on file     Comment: occ    Drug use: Never    Sexual activity: Not on file         Review of Systems   Constitutional:  Positive for fatigue.   Respiratory:  Positive for cough and wheezing.    All other systems reviewed and are negative.  Objective:     Vital Signs (Most Recent):  Temp: 97.8 °F (36.6 °C) (10/29/22 1154)  Pulse: 64 (10/29/22 1155)  Resp: (!) 22 (10/29/22 1155)  BP: (!) 152/84 (10/29/22 1154)  SpO2: (!) 94 % (10/29/22 1155)   Vital Signs (24h Range):  Temp:  [97.4 °F (36.3 °C)-98.7 °F (37.1 °C)] 97.8 °F (36.6 °C)  Pulse:  [] 64  Resp:  [16-22] 22  SpO2:  [93 %-98 %] 94 %  BP: (138-171)/(69-91) 152/84     Weight: 101.3 kg (223 lb 5.2 oz)  Body mass index is 32.98 kg/m².      Intake/Output Summary (Last 24 hours) at 10/29/2022 1243  Last data filed at 10/28/2022 1856  Gross per 24 hour   Intake 1302.2 ml   Output --   Net 1302.2 ml "       Physical Exam  Vitals and nursing note reviewed.   Constitutional:       Appearance: She is well-developed.   HENT:      Head: Normocephalic and atraumatic.      Nose: Nose normal.      Mouth/Throat:      Pharynx: No oropharyngeal exudate.   Eyes:      General: No scleral icterus.     Conjunctiva/sclera: Conjunctivae normal.      Pupils: Pupils are equal, round, and reactive to light.   Neck:      Thyroid: No thyromegaly.      Vascular: No JVD.      Trachea: No tracheal deviation.   Cardiovascular:      Rate and Rhythm: Normal rate and regular rhythm.      Heart sounds: Normal heart sounds, S1 normal and S2 normal. No murmur heard.  Pulmonary:      Effort: Pulmonary effort is normal. No tachypnea, accessory muscle usage or respiratory distress.      Breath sounds: No stridor. Wheezing present.   Abdominal:      General: Bowel sounds are normal. There is no distension.      Palpations: Abdomen is soft. There is no hepatomegaly, splenomegaly or mass.      Tenderness: There is no abdominal tenderness. There is no guarding or rebound.   Musculoskeletal:         General: No tenderness. Normal range of motion.      Cervical back: Normal range of motion and neck supple.   Lymphadenopathy:      Upper Body:      Right upper body: No supraclavicular adenopathy.      Left upper body: No supraclavicular adenopathy.   Skin:     General: Skin is warm and dry.      Findings: No rash.      Nails: There is no clubbing.   Neurological:      Mental Status: She is alert and oriented to person, place, and time.      Coordination: Coordination normal.      Gait: Gait normal.      Deep Tendon Reflexes: Reflexes are normal and symmetric.       Vents:  Oxygen Concentration (%): 32 (10/29/22 0011)    Lines/Drains/Airways       Peripheral Intravenous Line  Duration                  Peripheral IV - Single Lumen 10/27/22 2034 20 G Anterior;Right Forearm 1 day         Peripheral IV - Single Lumen 10/27/22 2034 22 G Posterior;Right Forearm  1 day                    Significant Labs:    CBC/Anemia Profile:  Recent Labs   Lab 10/28/22  0501 10/28/22  1644 10/29/22  0547   WBC 6.32 5.24 5.36   HGB 9.2* 11.2* 10.1*   HCT 26.6* 32.4* 30.1*    238 213   * 104* 108*   RDW 16.9* 17.0* 17.6*        Chemistries:  Recent Labs   Lab 10/28/22  0011 10/28/22  0501 10/29/22  0547    139 140   K 2.5* 2.6* 2.3*    103 102   CO2 19* 22* 24   BUN 6 6 6   CREATININE 0.8 0.8 0.7   CALCIUM 7.4* 7.2* 7.5*   ALBUMIN 3.4* 3.2* 3.2*   PROT 6.1 5.7* 5.8*   BILITOT 0.4 0.5 0.4   ALKPHOS 57 59 51*   ALT 9* 9* 11   AST 19 17 22   MG 0.8* 1.0* 1.6   PHOS  --  2.5* 2.8       ABGs: No results for input(s): PH, PCO2, HCO3, POCSATURATED, BE in the last 48 hours.  POCT Glucose: No results for input(s): POCTGLUCOSE in the last 48 hours.  Recent Lab Results         10/29/22  0547   10/28/22  1644        Albumin 3.2         Alkaline Phosphatase 51         ALT 11         Anion Gap 14         AST 22         Baso # 0.01   0.00       Basophil % 0.2   0.0       BILIRUBIN TOTAL 0.4  Comment: For infants and newborns, interpretation of results should be based  on gestational age, weight and in agreement with clinical  observations.    Premature Infant recommended reference ranges:  Up to 24 hours.............<8.0 mg/dL  Up to 48 hours............<12.0 mg/dL  3-5 days..................<15.0 mg/dL  6-29 days.................<15.0 mg/dL           BUN 6         Calcium 7.5         Chloride 102         CO2 24         Creatinine 0.7         Differential Method Automated   Automated       eGFR >60         Eos # 0.0   0.0       Eosinophil % 0.2   0.0       Glucose 140         Gran # (ANC) 3.5   4.4       Gran % 65.5   84.3       Hematocrit 30.1   32.4       Hemoglobin 10.1   11.2       Immature Grans (Abs) 0.08  Comment: Mild elevation in immature granulocytes is non specific and   can be seen in a variety of conditions including stress response,   acute inflammation, trauma and  pregnancy. Correlation with other   laboratory and clinical findings is essential.     0.12  Comment: Mild elevation in immature granulocytes is non specific and   can be seen in a variety of conditions including stress response,   acute inflammation, trauma and pregnancy. Correlation with other   laboratory and clinical findings is essential.         Immature Granulocytes 1.5   2.3       Lymph # 1.3   0.4       Lymph % 23.5   8.4       Magnesium 1.6         MCH 36.1   35.9       MCHC 33.6   34.6          104       Mono # 0.5   0.3       Mono % 9.1   5.0       MPV 9.8   9.0       nRBC 1   2       Phosphorus 2.8         Platelets 213   238       Potassium 2.3  Comment: K  critical result(s) called and verbal readback obtained from LEIDA LADD RN  by RA2 10/29/2022 11:12           PROTEIN TOTAL 5.8         RBC 2.80   3.12       RDW 17.6   17.0       Sodium 140         WBC 5.36   5.24             All pertinent labs within the past 24 hours have been reviewed.    Significant Imaging:   I have reviewed all pertinent imaging results/findings within the past 24 hours.    CT Abdomen Pelvis With Contrast  Narrative: EXAMINATION:  CT ABDOMEN PELVIS WITH CONTRAST    CLINICAL HISTORY:  Abdominal abscess/infection suspected;colitis;    TECHNIQUE:  Low dose axial images, sagittal and coronal reformations were obtained from the lung bases to the pubic symphysis following the IV administration of 100 mL of Omnipaque 350.  Oral contrast was not administered.    COMPARISON:  None    FINDINGS:  Heart: Normal size. No effusion.    Lung Bases: Clear.    Liver: Normal size and attenuation. No focal lesions.    Gallbladder: No calcified gallstones.    Bile Ducts: No dilatation.    Pancreas: No mass. No peripancreatic fat stranding.    Spleen: Normal.    Adrenals: Normal.    Kidneys/Ureters: Normal enhancement.  Scarring seen throughout portions of the right kidney.  No mass or  hydroureteronephrosis.    Bladder: No wall  thickening.    Reproductive organs: Right ovarian/adnexal cysts versus hydrosalpinx suspected.    GI Tract/Mesentery: No evidence of bowel obstruction..  No evidence of appendicitis.  Large bowel is collapsed which limits evaluation.  Mild thickening of the transverse and descending colon which could reflect mild infectious or inflammatory colitis.    Peritoneal Space: No ascites or free air.    Retroperitoneum: No significant adenopathy.    Abdominal wall: Small fat containing umbilical hernia.    Vasculature: No aneurysm. Aorta demonstrates atherosclerotic disease.    Bones: No acute fracture.  Moderate degenerative changes including facet arthropathy throughout the lower lumbar spine.  Remote left lateral transverse process fractures at L4 and L5.  No suspicious lytic or sclerotic lesions.  Impression: Mild thickening of the transverse and descending colon which could reflect mild infectious or inflammatory colitis.  No abdominal abscess identified    All CT scans at this facility use dose modulation, iterative reconstruction, and/or weight based dosing when appropriate to reduce radiation dose to as low as reasonable achievable.    Electronically signed by: Magdiel Richardson MD  Date:    10/27/2022  Time:    14:21  X-Ray Chest AP Portable  Narrative: EXAMINATION:  XR CHEST AP PORTABLE    CLINICAL HISTORY:  Chest pain;.    TECHNIQUE:  Single frontal portable view of the chest was performed.    COMPARISON:  None    FINDINGS:  Support devices: None    Calcified right hilar lymph node.The lungs are clear, with normal appearance of pulmonary vasculature and no pleural effusion or pneumothorax.    The cardiac silhouette is normal in size. The hilar and mediastinal contours are unremarkable.    Bones are intact.  Impression: No acute abnormality.    Electronically signed by: Joseluis Nj  Date:    10/27/2022  Time:    12:43       ABG  No results for input(s): PH, PO2, PCO2, HCO3, BE in the last 168 hours.  Assessment/Plan:      Current every day smoker  Smoking cessation    Hypokalemia due to excessive gastrointestinal loss of potassium  Monitor and replace    Asthma with acute exacerbation  Continue Bronchodilators and steriods  Check IGE  Smoking cessation    Influenza A infection   Tamiflu    Nausea & vomiting  IVF  Antiemetics    Gastroenteritis and colitis  Cont Cipro+ Flagyl        Thank you for your consult. I will follow-up with patient. Please contact us if you have any additional questions.     Jesus Verma MD  Pulmonology  [facility] Tele[facility] ([facility])

## 2022-10-30 LAB
ALBUMIN SERPL BCP-MCNC: 3.4 G/DL (ref 3.5–5.2)
ALP SERPL-CCNC: 54 U/L (ref 55–135)
ALT SERPL W/O P-5'-P-CCNC: 10 U/L (ref 10–44)
ANION GAP SERPL CALC-SCNC: 13 MMOL/L (ref 8–16)
AST SERPL-CCNC: 13 U/L (ref 10–40)
BASOPHILS # BLD AUTO: 0.01 K/UL (ref 0–0.2)
BASOPHILS NFR BLD: 0.2 % (ref 0–1.9)
BILIRUB SERPL-MCNC: 0.5 MG/DL (ref 0.1–1)
BUN SERPL-MCNC: 6 MG/DL (ref 6–20)
CALCIUM SERPL-MCNC: 8 MG/DL (ref 8.7–10.5)
CHLORIDE SERPL-SCNC: 101 MMOL/L (ref 95–110)
CO2 SERPL-SCNC: 25 MMOL/L (ref 23–29)
CREAT SERPL-MCNC: 0.7 MG/DL (ref 0.5–1.4)
DIFFERENTIAL METHOD: ABNORMAL
EOSINOPHIL # BLD AUTO: 0 K/UL (ref 0–0.5)
EOSINOPHIL NFR BLD: 0 % (ref 0–8)
ERYTHROCYTE [DISTWIDTH] IN BLOOD BY AUTOMATED COUNT: 16.9 % (ref 11.5–14.5)
EST. GFR  (NO RACE VARIABLE): >60 ML/MIN/1.73 M^2
GLUCOSE SERPL-MCNC: 162 MG/DL (ref 70–110)
HCT VFR BLD AUTO: 29.3 % (ref 37–48.5)
HGB BLD-MCNC: 10.2 G/DL (ref 12–16)
IGE SERPL-ACNC: <35 IU/ML (ref 0–100)
IMM GRANULOCYTES # BLD AUTO: 0.13 K/UL (ref 0–0.04)
IMM GRANULOCYTES NFR BLD AUTO: 2.4 % (ref 0–0.5)
LYMPHOCYTES # BLD AUTO: 0.7 K/UL (ref 1–4.8)
LYMPHOCYTES NFR BLD: 13 % (ref 18–48)
MAGNESIUM SERPL-MCNC: 1.5 MG/DL (ref 1.6–2.6)
MCH RBC QN AUTO: 36.3 PG (ref 27–31)
MCHC RBC AUTO-ENTMCNC: 34.8 G/DL (ref 32–36)
MCV RBC AUTO: 104 FL (ref 82–98)
MONOCYTES # BLD AUTO: 0.3 K/UL (ref 0.3–1)
MONOCYTES NFR BLD: 5.3 % (ref 4–15)
NEUTROPHILS # BLD AUTO: 4.2 K/UL (ref 1.8–7.7)
NEUTROPHILS NFR BLD: 79.1 % (ref 38–73)
NRBC BLD-RTO: 1 /100 WBC
PHOSPHATE SERPL-MCNC: 3.4 MG/DL (ref 2.7–4.5)
PLATELET # BLD AUTO: 212 K/UL (ref 150–450)
PMV BLD AUTO: 9.8 FL (ref 9.2–12.9)
POTASSIUM SERPL-SCNC: 3.2 MMOL/L (ref 3.5–5.1)
PROT SERPL-MCNC: 5.8 G/DL (ref 6–8.4)
RBC # BLD AUTO: 2.81 M/UL (ref 4–5.4)
SODIUM SERPL-SCNC: 139 MMOL/L (ref 136–145)
WBC # BLD AUTO: 5.31 K/UL (ref 3.9–12.7)

## 2022-10-30 PROCEDURE — 85025 COMPLETE CBC W/AUTO DIFF WBC: CPT | Performed by: INTERNAL MEDICINE

## 2022-10-30 PROCEDURE — 82785 ASSAY OF IGE: CPT | Performed by: INTERNAL MEDICINE

## 2022-10-30 PROCEDURE — 99223 1ST HOSP IP/OBS HIGH 75: CPT | Mod: ,,, | Performed by: INTERNAL MEDICINE

## 2022-10-30 PROCEDURE — 96366 THER/PROPH/DIAG IV INF ADDON: CPT

## 2022-10-30 PROCEDURE — 25000242 PHARM REV CODE 250 ALT 637 W/ HCPCS: Performed by: NURSE PRACTITIONER

## 2022-10-30 PROCEDURE — 21400001 HC TELEMETRY ROOM

## 2022-10-30 PROCEDURE — 99223 PR INITIAL HOSPITAL CARE,LEVL III: ICD-10-PCS | Mod: ,,, | Performed by: INTERNAL MEDICINE

## 2022-10-30 PROCEDURE — 94799 UNLISTED PULMONARY SVC/PX: CPT

## 2022-10-30 PROCEDURE — 36415 COLL VENOUS BLD VENIPUNCTURE: CPT | Performed by: INTERNAL MEDICINE

## 2022-10-30 PROCEDURE — 83735 ASSAY OF MAGNESIUM: CPT | Performed by: INTERNAL MEDICINE

## 2022-10-30 PROCEDURE — 63600175 PHARM REV CODE 636 W HCPCS: Performed by: NURSE PRACTITIONER

## 2022-10-30 PROCEDURE — 94640 AIRWAY INHALATION TREATMENT: CPT | Mod: XB

## 2022-10-30 PROCEDURE — 96376 TX/PRO/DX INJ SAME DRUG ADON: CPT

## 2022-10-30 PROCEDURE — 27000207 HC ISOLATION

## 2022-10-30 PROCEDURE — 84100 ASSAY OF PHOSPHORUS: CPT | Performed by: INTERNAL MEDICINE

## 2022-10-30 PROCEDURE — 80053 COMPREHEN METABOLIC PANEL: CPT | Performed by: INTERNAL MEDICINE

## 2022-10-30 PROCEDURE — 63600175 PHARM REV CODE 636 W HCPCS: Performed by: INTERNAL MEDICINE

## 2022-10-30 PROCEDURE — 25000003 PHARM REV CODE 250: Performed by: NURSE PRACTITIONER

## 2022-10-30 PROCEDURE — 99232 SBSQ HOSP IP/OBS MODERATE 35: CPT | Mod: ,,, | Performed by: INTERNAL MEDICINE

## 2022-10-30 PROCEDURE — 25000242 PHARM REV CODE 250 ALT 637 W/ HCPCS: Performed by: INTERNAL MEDICINE

## 2022-10-30 PROCEDURE — G0378 HOSPITAL OBSERVATION PER HR: HCPCS

## 2022-10-30 PROCEDURE — 25000003 PHARM REV CODE 250: Performed by: INTERNAL MEDICINE

## 2022-10-30 PROCEDURE — C9113 INJ PANTOPRAZOLE SODIUM, VIA: HCPCS | Performed by: NURSE PRACTITIONER

## 2022-10-30 PROCEDURE — 94761 N-INVAS EAR/PLS OXIMETRY MLT: CPT

## 2022-10-30 PROCEDURE — 99232 PR SUBSEQUENT HOSPITAL CARE,LEVL II: ICD-10-PCS | Mod: ,,, | Performed by: INTERNAL MEDICINE

## 2022-10-30 RX ADMIN — METRONIDAZOLE 500 MG: 500 TABLET ORAL at 05:10

## 2022-10-30 RX ADMIN — IPRATROPIUM BROMIDE AND ALBUTEROL SULFATE 3 ML: 2.5; .5 SOLUTION RESPIRATORY (INHALATION) at 04:10

## 2022-10-30 RX ADMIN — IPRATROPIUM BROMIDE AND ALBUTEROL SULFATE 3 ML: 2.5; .5 SOLUTION RESPIRATORY (INHALATION) at 07:10

## 2022-10-30 RX ADMIN — CETIRIZINE HYDROCHLORIDE 10 MG: 10 TABLET, FILM COATED ORAL at 09:10

## 2022-10-30 RX ADMIN — CIPROFLOXACIN 400 MG: 2 INJECTION, SOLUTION INTRAVENOUS at 05:10

## 2022-10-30 RX ADMIN — AMLODIPINE BESYLATE 5 MG: 5 TABLET ORAL at 09:10

## 2022-10-30 RX ADMIN — METRONIDAZOLE 500 MG: 500 TABLET ORAL at 09:10

## 2022-10-30 RX ADMIN — METHYLPREDNISOLONE SODIUM SUCCINATE 40 MG: 40 INJECTION, POWDER, FOR SOLUTION INTRAMUSCULAR; INTRAVENOUS at 12:10

## 2022-10-30 RX ADMIN — OSELTAMIVIR PHOSPHATE 75 MG: 75 CAPSULE ORAL at 08:10

## 2022-10-30 RX ADMIN — OSELTAMIVIR PHOSPHATE 75 MG: 75 CAPSULE ORAL at 09:10

## 2022-10-30 RX ADMIN — BUDESONIDE 0.5 MG: 0.5 INHALANT ORAL at 07:10

## 2022-10-30 RX ADMIN — PANTOPRAZOLE SODIUM 40 MG: 40 INJECTION, POWDER, FOR SOLUTION INTRAVENOUS at 08:10

## 2022-10-30 RX ADMIN — HYDRALAZINE HYDROCHLORIDE 10 MG: 20 INJECTION, SOLUTION INTRAMUSCULAR; INTRAVENOUS at 09:10

## 2022-10-30 RX ADMIN — METHYLPREDNISOLONE SODIUM SUCCINATE 40 MG: 40 INJECTION, POWDER, FOR SOLUTION INTRAMUSCULAR; INTRAVENOUS at 06:10

## 2022-10-30 RX ADMIN — METRONIDAZOLE 500 MG: 500 TABLET ORAL at 02:10

## 2022-10-30 RX ADMIN — PANTOPRAZOLE SODIUM 40 MG: 40 INJECTION, POWDER, FOR SOLUTION INTRAVENOUS at 09:10

## 2022-10-30 RX ADMIN — LISINOPRIL 10 MG: 10 TABLET ORAL at 08:10

## 2022-10-30 RX ADMIN — METHYLPREDNISOLONE SODIUM SUCCINATE 40 MG: 40 INJECTION, POWDER, FOR SOLUTION INTRAMUSCULAR; INTRAVENOUS at 05:10

## 2022-10-30 RX ADMIN — AMLODIPINE BESYLATE 5 MG: 5 TABLET ORAL at 08:10

## 2022-10-30 RX ADMIN — IPRATROPIUM BROMIDE AND ALBUTEROL SULFATE 3 ML: 2.5; .5 SOLUTION RESPIRATORY (INHALATION) at 12:10

## 2022-10-30 NOTE — PROGRESS NOTES
[facility] Medicine  Progress Note    Patient Name: Lucero Townsend  MRN: [id number]  Patient Class: IP- Inpatient   Admission Date: 10/27/2022  Length of Stay: 1 days  Attending Physician: Juan Gasca, *  Primary Care Provider: Primary Doctor No        Subjective:     Principal Problem:Sepsis        HPI:  The pt is a 62 yo female with PMHx significant for Asthma and tobacco abuse presented to the ED for evaluation of 4 days h/o cough, chest congestion,SOB, wheezing,  fever, chills, abdominal pain and cramp across the lower abd , nausea , vomiting and multiple episodes of diarrhea throughout the day with dark, loose stools. Denies chest pain, Pt was seen at [facility] on 10/25/22 for similar symptoms and diagnosed with colitis and asthma  and was discharged  home on oral Cipro /Flagyl and medrol dosepak. Pt returns here today and reports symptoms did not improve and now can't keep food down due to vomiting and still having diarrhea. Additionally pt is noted to have positive Influenza A. Unsure of sick contact and no recent travel. She is a current everyday smoker , denies drinking alcohol. Vitals on presentation - 140/90, P-93, RR-30, SpO2 99% on 3 L, T-98.9. Labs - WBC 9.1, K 2.3, CO2 17, Cr 0.9, Lipase normal, LA 5.8, BNP 60, Troponin 0.009, PCT 0.05, CXR - clear lungs, CT abd/pelvis - mild thickening of the transverse and descending colon suggestive of colitis. Pt received 30ml/kg bolus fluid, Cipro, Flagyl , Solumedrol 125 mg in the ED and  consulted for admission.     Pt will be placed in observation under  service for sepsis due to colitis and influenza, Severe hypokalemia due to gastroenteritis and vomiting, and Metabolic acidosis/ Lactic acidosis.       Overview/Hospital Course:  Patient admitted for sepsis due to gastroenteritis and colitis and started on IV cipro/flagyl. Stool studies pending. GI consult. Serum potassium 2.6 and magnesium 1.0. Will replete. Blood  pressure elevated will start lisinopril. 10/29/22: Wheezing continued despite treatment. Pulmonology consult. CMP pending, will replace electrolytes as needed. Patient tolerating diet.     10/30 She report fee;ling better . Cont with b/l wheezing and sob on minimal exertion . CT chest caryn cute abnormality . Jovanny for c.diff negative . GI rec to cont antibiotic and outpt c-scope       Interval History:     Review of Systems   Constitutional:  Positive for fatigue.   Eyes: Negative.    Respiratory:  Positive for cough, shortness of breath and wheezing.    Gastrointestinal:  Positive for abdominal pain and diarrhea.   Endocrine: Negative.    Genitourinary: Negative.    Musculoskeletal: Negative.    Skin: Negative.    Allergic/Immunologic: Negative.    Neurological: Negative.    Hematological: Negative.    Psychiatric/Behavioral: Negative.     Objective:     Vital Signs (Most Recent):  Temp: 98 °F (36.7 °C) (10/30/22 1138)  Pulse: 69 (10/30/22 1138)  Resp: 18 (10/30/22 1138)  BP: (!) 170/83 (10/30/22 1138)  SpO2: (!) 93 % (10/30/22 1138)   Vital Signs (24h Range):  Temp:  [97.6 °F (36.4 °C)-99 °F (37.2 °C)] 98 °F (36.7 °C)  Pulse:  [63-78] 69  Resp:  [16-20] 18  SpO2:  [92 %-95 %] 93 %  BP: (117-170)/(63-83) 170/83     Weight: 106.5 kg (234 lb 12.6 oz)  Body mass index is 34.67 kg/m².    Intake/Output Summary (Last 24 hours) at 10/30/2022 1334  Last data filed at 10/29/2022 1800  Gross per 24 hour   Intake 880 ml   Output --   Net 880 ml      Physical Exam  Vitals and nursing note reviewed.   Constitutional:       Appearance: She is well-developed.   HENT:      Head: Normocephalic and atraumatic.      Nose: Nose normal.      Mouth/Throat:      Pharynx: No oropharyngeal exudate.   Eyes:      General: No scleral icterus.     Conjunctiva/sclera: Conjunctivae normal.      Pupils: Pupils are equal, round, and reactive to light.   Neck:      Thyroid: No thyromegaly.      Vascular: No JVD.      Trachea: No tracheal deviation.    Cardiovascular:      Rate and Rhythm: Normal rate and regular rhythm.      Heart sounds: Normal heart sounds, S1 normal and S2 normal. No murmur heard.  Pulmonary:      Effort: Pulmonary effort is normal. No tachypnea, accessory muscle usage or respiratory distress.      Breath sounds: No stridor. Wheezing present. No rhonchi.   Abdominal:      General: Bowel sounds are normal. There is no distension.      Palpations: Abdomen is soft. There is no hepatomegaly, splenomegaly or mass.      Tenderness: There is no abdominal tenderness. There is no guarding or rebound.   Musculoskeletal:         General: No tenderness. Normal range of motion.      Cervical back: Normal range of motion and neck supple.   Lymphadenopathy:      Upper Body:      Right upper body: No supraclavicular adenopathy.      Left upper body: No supraclavicular adenopathy.   Skin:     General: Skin is warm and dry.      Findings: No rash.      Nails: There is no clubbing.   Neurological:      Mental Status: She is alert and oriented to person, place, and time.      Coordination: Coordination normal.      Gait: Gait normal.      Deep Tendon Reflexes: Reflexes are normal and symmetric.       Significant Labs: All pertinent labs within the past 24 hours have been reviewed.      Significant Imaging: I have reviewed all pertinent imaging results/findings within the past 24 hours.        Assessment/Plan:      * Sepsis  Pt meets two SIRS criteria HR>90, RR >20     This patient does have evidence of infective focus  My overall impression is sepsis. Vital signs were reviewed and noted in progress note.  Antibiotics given-   Antibiotics (From admission, onward)    Start     Stop Route Frequency Ordered    10/27/22 2200  metroNIDAZOLE tablet 500 mg         -- Oral Every 8 hours 10/27/22 1454    10/27/22 1600  ciprofloxacin (CIPRO)400mg/200ml D5W IVPB 400 mg         -- IV Every 12 hours (non-standard times) 10/27/22 1454        Cultures were taken-    Microbiology Results (last 7 days)     Procedure Component Value Units Date/Time    Stool culture [[id number]] Collected: 10/28/22 0345    Order Status: Completed Specimen: Stool Updated: 10/30/22 0951     Stool Culture Nothing significant to date    Blood Culture #1 **CANNOT BE ORDERED STAT** [[id number]] Collected: 10/27/22 1252    Order Status: Completed Specimen: Blood from Peripheral, Antecubital, Left Updated: 10/30/22 0613     Blood Culture, Routine No Growth to date      No Growth to date      No Growth to date    Blood Culture #2 **CANNOT BE ORDERED STAT** [[id number]] Collected: 10/27/22 1301    Order Status: Completed Specimen: Blood Updated: 10/30/22 0613     Blood Culture, Routine No Growth to date      No Growth to date      No Growth to date    E. coli 0157 antigen [[id number]] Collected: 10/28/22 0345    Order Status: No result Specimen: Stool Updated: 10/28/22 1657    Clostridium difficile EIA [[id number]] Collected: 10/28/22 0345    Order Status: Completed Specimen: Stool Updated: 10/28/22 0926     C. diff Antigen Negative     C difficile Toxins A+B, EIA Negative     Comment: Testing not recommended for children <24 months old.           Latest lactate reviewed, they are-  Recent Labs   Lab 10/27/22  1926 10/28/22  0011 10/28/22  0501   LACTATE 9.6* 4.9* 3.9*       Organ dysfunction indicated by none  Source- GI tract , Respiratory tract     Source control Achieved by-     Antiviral   Empiric antibacterial targeting intra abdominal infection     WBCs remain normal   Afebrile           Obesity (BMI 30-39.9)  Body mass index is 34.67 kg/m². Morbid obesity complicates all aspects of disease management from diagnostic modalities to treatment. Weight loss encouraged and health benefits explained to patient.         Current every day smoker  Assistance with smoking cessation was offered, including:  []  Medications  [x]  Counseling  []  Printed Information on Smoking Cessation  []  Referral to a Smoking  Cessation Program    Patient was counseled regarding smoking for 3-10 minutes.          Macrocytic anemia  - Denies h/o alcohol use   -H/H stable    -iron study , B12, folic acid level pending       Hypokalemia due to excessive gastrointestinal loss of potassium  - Due to vomiting and diarrhea in the setting of gastroenteritis   -Replete as indicated     Patient denies any further vomiting or diarrhea at this time   K+2.3, will replete     Asthma with acute exacerbation  - SKYE, Budesonide, IV steroids  -Pulmonology consult      Influenza A infection   - Oseltamivir 75 mg bid x 5 days   -Support care     Nausea & vomiting  -Improved   - Anti emetics as needed       Gastroenteritis and colitis  - Likely viral. Get stool study   -Empiric antibiotic treatment targeting intra abdominal infection with Cipro and Flagyl   -Fluid resuscitation   -Electrolyte replacement   -Other supportive treatment     10/29/22  -Continue abx therapy   -Follow stool studies   -Tolerating diet       VTE Risk Mitigation (From admission, onward)         Ordered     Place sequential compression device  Until discontinued         10/27/22 1650     IP VTE LOW RISK PATIENT  Once         10/27/22 1650                Discharge Planning   MANAV:      Code Status: Full Code   Is the patient medically ready for discharge?:     Reason for patient still in hospital (select all that apply): Treatment  Discharge Plan A: Home, Home with family                  Juan Juliette Gasca MD  Department of Hospital Medicine   [facility] ([facility])

## 2022-10-30 NOTE — PROGRESS NOTES
[facility] ([facility])  Pulmonology  Progress Note    Patient Name: Lucero Townsend  MRN: [id number]  Admission Date: 10/27/2022  Hospital Length of Stay: 1 days  Code Status: Full Code  Attending Provider: Juan Gasca, *  Primary Care Provider: Primary Doctor No   Principal Problem: Sepsis    Subjective:     Interval History:   10/30: Seen and examined, wheezing slightly better, Chest CT: no acute abn, K 3.2, Mg 1.5, T max 99F, cultures -ve, still has abd complaints anticipated coloscop?      Review of Systems   Constitutional:  Negative for fever, malaise/fatigue and weight loss.   Respiratory:  Positive for wheezing.    Gastrointestinal:  Positive for abdominal pain, diarrhea and nausea. Negative for blood in stool.   All other systems reviewed and are negative.      Objective:     Vital Signs (Most Recent):  Temp: 97.6 °F (36.4 °C) (10/30/22 0729)  Pulse: 70 (10/30/22 0737)  Resp: 16 (10/30/22 0737)  BP: (!) 156/67 (10/30/22 0729)  SpO2: 95 % (10/30/22 0737)   Vital Signs (24h Range):  Temp:  [97.6 °F (36.4 °C)-99 °F (37.2 °C)] 97.6 °F (36.4 °C)  Pulse:  [62-78] 70  Resp:  [16-22] 16  SpO2:  [92 %-95 %] 95 %  BP: (117-161)/(63-84) 156/67     Weight: 106.5 kg (234 lb 12.6 oz)  Body mass index is 34.67 kg/m².      Intake/Output Summary (Last 24 hours) at 10/30/2022 0919  Last data filed at 10/29/2022 1800  Gross per 24 hour   Intake 880 ml   Output --   Net 880 ml       Physical Exam  Vitals and nursing note reviewed.   Constitutional:       Appearance: She is well-developed.   HENT:      Head: Normocephalic and atraumatic.      Nose: Nose normal.      Mouth/Throat:      Pharynx: No oropharyngeal exudate.   Eyes:      General: No scleral icterus.     Conjunctiva/sclera: Conjunctivae normal.      Pupils: Pupils are equal, round, and reactive to light.   Neck:      Thyroid: No thyromegaly.      Vascular: No JVD.      Trachea: No tracheal deviation.   Cardiovascular:      Rate and Rhythm: Normal  rate and regular rhythm.      Heart sounds: Normal heart sounds, S1 normal and S2 normal. No murmur heard.  Pulmonary:      Effort: Pulmonary effort is normal. No tachypnea, accessory muscle usage or respiratory distress.      Breath sounds: No stridor. Wheezing present. No rhonchi.   Abdominal:      General: Bowel sounds are normal. There is no distension.      Palpations: Abdomen is soft. There is no hepatomegaly, splenomegaly or mass.      Tenderness: There is no abdominal tenderness. There is no guarding or rebound.   Musculoskeletal:         General: No tenderness. Normal range of motion.      Cervical back: Normal range of motion and neck supple.   Lymphadenopathy:      Upper Body:      Right upper body: No supraclavicular adenopathy.      Left upper body: No supraclavicular adenopathy.   Skin:     General: Skin is warm and dry.      Findings: No rash.      Nails: There is no clubbing.   Neurological:      Mental Status: She is alert and oriented to person, place, and time.      Coordination: Coordination normal.      Gait: Gait normal.      Deep Tendon Reflexes: Reflexes are normal and symmetric.       Vents:  Oxygen Concentration (%): 21 (10/30/22 0404)    Lines/Drains/Airways       Peripheral Intravenous Line  Duration                  Peripheral IV - Single Lumen 10/27/22 2034 20 G Anterior;Right Forearm 2 days         Peripheral IV - Single Lumen 10/27/22 2034 22 G Posterior;Right Forearm 2 days                    Significant Labs:    CBC/Anemia Profile:  Recent Labs   Lab 10/28/22  1644 10/29/22  0547 10/30/22  0520   WBC 5.24 5.36 5.31   HGB 11.2* 10.1* 10.2*   HCT 32.4* 30.1* 29.3*    213 212   * 108* 104*   RDW 17.0* 17.6* 16.9*        Chemistries:  Recent Labs   Lab 10/29/22  0547 10/30/22  0520    139   K 2.3* 3.2*    101   CO2 24 25   BUN 6 6   CREATININE 0.7 0.7   CALCIUM 7.5* 8.0*   ALBUMIN 3.2* 3.4*   PROT 5.8* 5.8*   BILITOT 0.4 0.5   ALKPHOS 51* 54*   ALT 11 10   AST  22 13   MG 1.6 1.5*   PHOS 2.8 3.4       All pertinent labs within the past 24 hours have been reviewed.    Significant Imaging:  I have reviewed all pertinent imaging results/findings within the past 24 hours.    CT Chest Without Contrast  Narrative: EXAMINATION:  CT CHEST WITHOUT CONTRAST    CLINICAL HISTORY:  Cough, persistent;    TECHNIQUE:  The chest was scanned without intravenous contrast.    COMPARISON:  Chest x-ray, 10/27/2022    FINDINGS:  Normal heart size.  There is calcific atherosclerotic disease of the coronary arteries.  No pericardial effusion.  No thoracic adenopathy.  Calcified right hilar lymph nodes from prior granulomatous disease.    Dependent atelectasis.  No consolidation.  No pleural effusion or pneumothorax.    The upper abdominal visualized organs are within normal limits.    The bones are intact.  Impression: 1. Atelectasis in the lung bases bilaterally but no acute lung infiltrate or findings to indicate pneumonia.  2. No lymphadenopathy in the thorax.  All CT scans at this facility use dose modulation, iterative reconstruction and/or weight based dosing when appropriate to reduce radiation dose to as low as reasonably achievable.    Electronically signed by: Jim Ramirez MD  Date:    10/29/2022  Time:    15:51       ABG  No results for input(s): PH, PO2, PCO2, HCO3, BE in the last 168 hours.  Assessment/Plan:     Current every day smoker  Smoking cessation    Hypokalemia due to excessive gastrointestinal loss of potassium  Monitor and replace    Asthma with acute exacerbation  Continue Bronchodilators and steriods  Check IGE  Smoking cessation    Influenza A infection   Tamiflu x 5    Nausea & vomiting  IVF  Antiemetics    Gastroenteritis and colitis  Cont Cipro+ Flagyl         Jesus Verma MD  Pulmonology  [facility] ([facility])

## 2022-10-30 NOTE — ASSESSMENT & PLAN NOTE
Pt meets two SIRS criteria HR>90, RR >20     This patient does have evidence of infective focus  My overall impression is sepsis. Vital signs were reviewed and noted in progress note.  Antibiotics given-   Antibiotics (From admission, onward)    Start     Stop Route Frequency Ordered    10/27/22 2200  metroNIDAZOLE tablet 500 mg         -- Oral Every 8 hours 10/27/22 1454    10/27/22 1600  ciprofloxacin (CIPRO)400mg/200ml D5W IVPB 400 mg         -- IV Every 12 hours (non-standard times) 10/27/22 1454        Cultures were taken-   Microbiology Results (last 7 days)     Procedure Component Value Units Date/Time    Stool culture [[id number]] Collected: 10/28/22 0345    Order Status: Completed Specimen: Stool Updated: 10/30/22 0951     Stool Culture Nothing significant to date    Blood Culture #1 **CANNOT BE ORDERED STAT** [[id number]] Collected: 10/27/22 1252    Order Status: Completed Specimen: Blood from Peripheral, Antecubital, Left Updated: 10/30/22 0613     Blood Culture, Routine No Growth to date      No Growth to date      No Growth to date    Blood Culture #2 **CANNOT BE ORDERED STAT** [[id number]] Collected: 10/27/22 1301    Order Status: Completed Specimen: Blood Updated: 10/30/22 0613     Blood Culture, Routine No Growth to date      No Growth to date      No Growth to date    E. coli 0157 antigen [[id number]] Collected: 10/28/22 0345    Order Status: No result Specimen: Stool Updated: 10/28/22 1657    Clostridium difficile EIA [[id number]] Collected: 10/28/22 0345    Order Status: Completed Specimen: Stool Updated: 10/28/22 0926     C. diff Antigen Negative     C difficile Toxins A+B, EIA Negative     Comment: Testing not recommended for children <24 months old.           Latest lactate reviewed, they are-  Recent Labs   Lab 10/27/22  1926 10/28/22  0011 10/28/22  0501   LACTATE 9.6* 4.9* 3.9*       Organ dysfunction indicated by none  Source- GI tract , Respiratory tract     Source control Achieved by-      Antiviral   Empiric antibacterial targeting intra abdominal infection     WBCs remain normal   Afebrile

## 2022-10-30 NOTE — SUBJECTIVE & OBJECTIVE
Interval History:     Review of Systems   Constitutional:  Positive for fatigue.   Eyes: Negative.    Respiratory:  Positive for cough, shortness of breath and wheezing.    Gastrointestinal:  Positive for abdominal pain and diarrhea.   Endocrine: Negative.    Genitourinary: Negative.    Musculoskeletal: Negative.    Skin: Negative.    Allergic/Immunologic: Negative.    Neurological: Negative.    Hematological: Negative.    Psychiatric/Behavioral: Negative.     Objective:     Vital Signs (Most Recent):  Temp: 98 °F (36.7 °C) (10/30/22 1138)  Pulse: 69 (10/30/22 1138)  Resp: 18 (10/30/22 1138)  BP: (!) 170/83 (10/30/22 1138)  SpO2: (!) 93 % (10/30/22 1138)   Vital Signs (24h Range):  Temp:  [97.6 °F (36.4 °C)-99 °F (37.2 °C)] 98 °F (36.7 °C)  Pulse:  [63-78] 69  Resp:  [16-20] 18  SpO2:  [92 %-95 %] 93 %  BP: (117-170)/(63-83) 170/83     Weight: 106.5 kg (234 lb 12.6 oz)  Body mass index is 34.67 kg/m².    Intake/Output Summary (Last 24 hours) at 10/30/2022 1334  Last data filed at 10/29/2022 1800  Gross per 24 hour   Intake 880 ml   Output --   Net 880 ml      Physical Exam  Vitals and nursing note reviewed.   Constitutional:       Appearance: She is well-developed.   HENT:      Head: Normocephalic and atraumatic.      Nose: Nose normal.      Mouth/Throat:      Pharynx: No oropharyngeal exudate.   Eyes:      General: No scleral icterus.     Conjunctiva/sclera: Conjunctivae normal.      Pupils: Pupils are equal, round, and reactive to light.   Neck:      Thyroid: No thyromegaly.      Vascular: No JVD.      Trachea: No tracheal deviation.   Cardiovascular:      Rate and Rhythm: Normal rate and regular rhythm.      Heart sounds: Normal heart sounds, S1 normal and S2 normal. No murmur heard.  Pulmonary:      Effort: Pulmonary effort is normal. No tachypnea, accessory muscle usage or respiratory distress.      Breath sounds: No stridor. Wheezing present. No rhonchi.   Abdominal:      General: Bowel sounds are normal.  There is no distension.      Palpations: Abdomen is soft. There is no hepatomegaly, splenomegaly or mass.      Tenderness: There is no abdominal tenderness. There is no guarding or rebound.   Musculoskeletal:         General: No tenderness. Normal range of motion.      Cervical back: Normal range of motion and neck supple.   Lymphadenopathy:      Upper Body:      Right upper body: No supraclavicular adenopathy.      Left upper body: No supraclavicular adenopathy.   Skin:     General: Skin is warm and dry.      Findings: No rash.      Nails: There is no clubbing.   Neurological:      Mental Status: She is alert and oriented to person, place, and time.      Coordination: Coordination normal.      Gait: Gait normal.      Deep Tendon Reflexes: Reflexes are normal and symmetric.       Significant Labs: All pertinent labs within the past 24 hours have been reviewed.      Significant Imaging: I have reviewed all pertinent imaging results/findings within the past 24 hours.

## 2022-10-30 NOTE — PROGRESS NOTES
Patient was seen today.  Ms. Townsend reports improvement in her abdominal pain with no tenderness on palpation today on either superficial or deep palpation.  She still had some diarrhea that has improved with some loose stools.  She denies any hematochezia or melena.    Stool cultures with no growth to date and C diff is negative.  She is still on Cipro and Flagyl.    Patient can continue Cipro Flagyl for her presumed infectious colitis can be viral but at this point would recommend completing a course of 14 days of both ciprofloxacin and Flagyl.    Will plan for an outpatient colonoscopy at this point given the improvement in her symptoms to assess the changes seen on her CT abdomen and pelvis.    Gautam Patterson MD  Gastroenterology  Director of Advanced Endoscopy at [facility]

## 2022-10-30 NOTE — ASSESSMENT & PLAN NOTE
Body mass index is 34.67 kg/m². Morbid obesity complicates all aspects of disease management from diagnostic modalities to treatment. Weight loss encouraged and health benefits explained to patient.

## 2022-10-30 NOTE — SUBJECTIVE & OBJECTIVE
Interval History:   10/30: Seen and examined, wheezing slightly better, Chest CT: no acute abn, K 3.2, Mg 1.5, T max 99F, cultures -ve, still has abd complaints anticipated coloscop?      Review of Systems   Constitutional:  Negative for fever, malaise/fatigue and weight loss.   Respiratory:  Positive for wheezing.    Gastrointestinal:  Positive for abdominal pain, diarrhea and nausea. Negative for blood in stool.   All other systems reviewed and are negative.      Objective:     Vital Signs (Most Recent):  Temp: 97.6 °F (36.4 °C) (10/30/22 0729)  Pulse: 70 (10/30/22 0737)  Resp: 16 (10/30/22 0737)  BP: (!) 156/67 (10/30/22 0729)  SpO2: 95 % (10/30/22 0737)   Vital Signs (24h Range):  Temp:  [97.6 °F (36.4 °C)-99 °F (37.2 °C)] 97.6 °F (36.4 °C)  Pulse:  [62-78] 70  Resp:  [16-22] 16  SpO2:  [92 %-95 %] 95 %  BP: (117-161)/(63-84) 156/67     Weight: 106.5 kg (234 lb 12.6 oz)  Body mass index is 34.67 kg/m².      Intake/Output Summary (Last 24 hours) at 10/30/2022 0919  Last data filed at 10/29/2022 1800  Gross per 24 hour   Intake 880 ml   Output --   Net 880 ml       Physical Exam  Vitals and nursing note reviewed.   Constitutional:       Appearance: She is well-developed.   HENT:      Head: Normocephalic and atraumatic.      Nose: Nose normal.      Mouth/Throat:      Pharynx: No oropharyngeal exudate.   Eyes:      General: No scleral icterus.     Conjunctiva/sclera: Conjunctivae normal.      Pupils: Pupils are equal, round, and reactive to light.   Neck:      Thyroid: No thyromegaly.      Vascular: No JVD.      Trachea: No tracheal deviation.   Cardiovascular:      Rate and Rhythm: Normal rate and regular rhythm.      Heart sounds: Normal heart sounds, S1 normal and S2 normal. No murmur heard.  Pulmonary:      Effort: Pulmonary effort is normal. No tachypnea, accessory muscle usage or respiratory distress.      Breath sounds: No stridor. Wheezing present. No rhonchi.   Abdominal:      General: Bowel sounds are  normal. There is no distension.      Palpations: Abdomen is soft. There is no hepatomegaly, splenomegaly or mass.      Tenderness: There is no abdominal tenderness. There is no guarding or rebound.   Musculoskeletal:         General: No tenderness. Normal range of motion.      Cervical back: Normal range of motion and neck supple.   Lymphadenopathy:      Upper Body:      Right upper body: No supraclavicular adenopathy.      Left upper body: No supraclavicular adenopathy.   Skin:     General: Skin is warm and dry.      Findings: No rash.      Nails: There is no clubbing.   Neurological:      Mental Status: She is alert and oriented to person, place, and time.      Coordination: Coordination normal.      Gait: Gait normal.      Deep Tendon Reflexes: Reflexes are normal and symmetric.       Vents:  Oxygen Concentration (%): 21 (10/30/22 0404)    Lines/Drains/Airways       Peripheral Intravenous Line  Duration                  Peripheral IV - Single Lumen 10/27/22 2034 20 G Anterior;Right Forearm 2 days         Peripheral IV - Single Lumen 10/27/22 2034 22 G Posterior;Right Forearm 2 days                    Significant Labs:    CBC/Anemia Profile:  Recent Labs   Lab 10/28/22  1644 10/29/22  0547 10/30/22  0520   WBC 5.24 5.36 5.31   HGB 11.2* 10.1* 10.2*   HCT 32.4* 30.1* 29.3*    213 212   * 108* 104*   RDW 17.0* 17.6* 16.9*        Chemistries:  Recent Labs   Lab 10/29/22  0547 10/30/22  0520    139   K 2.3* 3.2*    101   CO2 24 25   BUN 6 6   CREATININE 0.7 0.7   CALCIUM 7.5* 8.0*   ALBUMIN 3.2* 3.4*   PROT 5.8* 5.8*   BILITOT 0.4 0.5   ALKPHOS 51* 54*   ALT 11 10   AST 22 13   MG 1.6 1.5*   PHOS 2.8 3.4       All pertinent labs within the past 24 hours have been reviewed.    Significant Imaging:  I have reviewed all pertinent imaging results/findings within the past 24 hours.    CT Chest Without Contrast  Narrative: EXAMINATION:  CT CHEST WITHOUT CONTRAST    CLINICAL HISTORY:  Cough,  persistent;    TECHNIQUE:  The chest was scanned without intravenous contrast.    COMPARISON:  Chest x-ray, 10/27/2022    FINDINGS:  Normal heart size.  There is calcific atherosclerotic disease of the coronary arteries.  No pericardial effusion.  No thoracic adenopathy.  Calcified right hilar lymph nodes from prior granulomatous disease.    Dependent atelectasis.  No consolidation.  No pleural effusion or pneumothorax.    The upper abdominal visualized organs are within normal limits.    The bones are intact.  Impression: 1. Atelectasis in the lung bases bilaterally but no acute lung infiltrate or findings to indicate pneumonia.  2. No lymphadenopathy in the thorax.  All CT scans at this facility use dose modulation, iterative reconstruction and/or weight based dosing when appropriate to reduce radiation dose to as low as reasonably achievable.    Electronically signed by: Jim Ramirez MD  Date:    10/29/2022  Time:    15:51

## 2022-10-31 VITALS
RESPIRATION RATE: 18 BRPM | WEIGHT: 230.38 LBS | HEIGHT: 69 IN | HEART RATE: 82 BPM | BODY MASS INDEX: 34.12 KG/M2 | TEMPERATURE: 98 F | SYSTOLIC BLOOD PRESSURE: 180 MMHG | DIASTOLIC BLOOD PRESSURE: 86 MMHG | OXYGEN SATURATION: 95 %

## 2022-10-31 LAB
ALBUMIN SERPL BCP-MCNC: 3.4 G/DL (ref 3.5–5.2)
ALP SERPL-CCNC: 53 U/L (ref 55–135)
ALT SERPL W/O P-5'-P-CCNC: 12 U/L (ref 10–44)
ANION GAP SERPL CALC-SCNC: 13 MMOL/L (ref 8–16)
AST SERPL-CCNC: 14 U/L (ref 10–40)
BACTERIA STL CULT: NORMAL
BASOPHILS # BLD AUTO: 0.02 K/UL (ref 0–0.2)
BASOPHILS NFR BLD: 0.3 % (ref 0–1.9)
BILIRUB SERPL-MCNC: 0.5 MG/DL (ref 0.1–1)
BUN SERPL-MCNC: 8 MG/DL (ref 6–20)
CALCIUM SERPL-MCNC: 8.5 MG/DL (ref 8.7–10.5)
CHLORIDE SERPL-SCNC: 100 MMOL/L (ref 95–110)
CO2 SERPL-SCNC: 25 MMOL/L (ref 23–29)
CREAT SERPL-MCNC: 0.7 MG/DL (ref 0.5–1.4)
DIFFERENTIAL METHOD: ABNORMAL
E COLI SXT1 STL QL IA: NEGATIVE
E COLI SXT2 STL QL IA: NEGATIVE
EOSINOPHIL # BLD AUTO: 0 K/UL (ref 0–0.5)
EOSINOPHIL NFR BLD: 0 % (ref 0–8)
ERYTHROCYTE [DISTWIDTH] IN BLOOD BY AUTOMATED COUNT: 17.5 % (ref 11.5–14.5)
EST. GFR  (NO RACE VARIABLE): >60 ML/MIN/1.73 M^2
GLUCOSE SERPL-MCNC: 176 MG/DL (ref 70–110)
HCT VFR BLD AUTO: 32.5 % (ref 37–48.5)
HGB BLD-MCNC: 11 G/DL (ref 12–16)
IMM GRANULOCYTES # BLD AUTO: 0.17 K/UL (ref 0–0.04)
IMM GRANULOCYTES NFR BLD AUTO: 2.5 % (ref 0–0.5)
LYMPHOCYTES # BLD AUTO: 1 K/UL (ref 1–4.8)
LYMPHOCYTES NFR BLD: 14.3 % (ref 18–48)
MAGNESIUM SERPL-MCNC: 1.5 MG/DL (ref 1.6–2.6)
MCH RBC QN AUTO: 35.9 PG (ref 27–31)
MCHC RBC AUTO-ENTMCNC: 33.8 G/DL (ref 32–36)
MCV RBC AUTO: 106 FL (ref 82–98)
MONOCYTES # BLD AUTO: 0.4 K/UL (ref 0.3–1)
MONOCYTES NFR BLD: 6.6 % (ref 4–15)
NEUTROPHILS # BLD AUTO: 5.1 K/UL (ref 1.8–7.7)
NEUTROPHILS NFR BLD: 76.3 % (ref 38–73)
NRBC BLD-RTO: 1 /100 WBC
OVALOCYTES BLD QL SMEAR: ABNORMAL
PHOSPHATE SERPL-MCNC: 3.4 MG/DL (ref 2.7–4.5)
PLATELET # BLD AUTO: 206 K/UL (ref 150–450)
PLATELET BLD QL SMEAR: ABNORMAL
PMV BLD AUTO: 10.4 FL (ref 9.2–12.9)
POIKILOCYTOSIS BLD QL SMEAR: SLIGHT
POLYCHROMASIA BLD QL SMEAR: ABNORMAL
POTASSIUM SERPL-SCNC: 2.9 MMOL/L (ref 3.5–5.1)
PROT SERPL-MCNC: 6 G/DL (ref 6–8.4)
RBC # BLD AUTO: 3.06 M/UL (ref 4–5.4)
SODIUM SERPL-SCNC: 138 MMOL/L (ref 136–145)
WBC # BLD AUTO: 6.69 K/UL (ref 3.9–12.7)

## 2022-10-31 PROCEDURE — 63600175 PHARM REV CODE 636 W HCPCS: Performed by: NURSE PRACTITIONER

## 2022-10-31 PROCEDURE — G0378 HOSPITAL OBSERVATION PER HR: HCPCS

## 2022-10-31 PROCEDURE — 25000242 PHARM REV CODE 250 ALT 637 W/ HCPCS: Performed by: INTERNAL MEDICINE

## 2022-10-31 PROCEDURE — 85025 COMPLETE CBC W/AUTO DIFF WBC: CPT | Performed by: INTERNAL MEDICINE

## 2022-10-31 PROCEDURE — 96376 TX/PRO/DX INJ SAME DRUG ADON: CPT

## 2022-10-31 PROCEDURE — 96365 THER/PROPH/DIAG IV INF INIT: CPT | Mod: 59

## 2022-10-31 PROCEDURE — 83735 ASSAY OF MAGNESIUM: CPT | Performed by: INTERNAL MEDICINE

## 2022-10-31 PROCEDURE — 94640 AIRWAY INHALATION TREATMENT: CPT

## 2022-10-31 PROCEDURE — 94761 N-INVAS EAR/PLS OXIMETRY MLT: CPT

## 2022-10-31 PROCEDURE — 36415 COLL VENOUS BLD VENIPUNCTURE: CPT | Performed by: INTERNAL MEDICINE

## 2022-10-31 PROCEDURE — 63600175 PHARM REV CODE 636 W HCPCS: Performed by: INTERNAL MEDICINE

## 2022-10-31 PROCEDURE — 25000003 PHARM REV CODE 250: Performed by: INTERNAL MEDICINE

## 2022-10-31 PROCEDURE — 94618 PULMONARY STRESS TESTING: CPT

## 2022-10-31 PROCEDURE — 25000242 PHARM REV CODE 250 ALT 637 W/ HCPCS: Performed by: NURSE PRACTITIONER

## 2022-10-31 PROCEDURE — 80053 COMPREHEN METABOLIC PANEL: CPT | Performed by: INTERNAL MEDICINE

## 2022-10-31 PROCEDURE — 84100 ASSAY OF PHOSPHORUS: CPT | Performed by: INTERNAL MEDICINE

## 2022-10-31 PROCEDURE — 25000003 PHARM REV CODE 250: Performed by: NURSE PRACTITIONER

## 2022-10-31 PROCEDURE — C9113 INJ PANTOPRAZOLE SODIUM, VIA: HCPCS | Performed by: NURSE PRACTITIONER

## 2022-10-31 PROCEDURE — 99900035 HC TECH TIME PER 15 MIN (STAT)

## 2022-10-31 RX ORDER — LOSARTAN POTASSIUM 25 MG/1
25 TABLET ORAL DAILY
Qty: 30 TABLET | Refills: 2 | Status: SHIPPED | OUTPATIENT
Start: 2022-10-31 | End: 2023-10-31

## 2022-10-31 RX ORDER — CIPROFLOXACIN 500 MG/1
500 TABLET ORAL EVERY 12 HOURS
Qty: 14 TABLET | Refills: 0 | Status: SHIPPED | OUTPATIENT
Start: 2022-10-31 | End: 2022-11-07

## 2022-10-31 RX ORDER — PREDNISONE 10 MG/1
TABLET ORAL
Qty: 33 TABLET | Refills: 0 | Status: SHIPPED | OUTPATIENT
Start: 2022-10-31 | End: 2022-11-14

## 2022-10-31 RX ORDER — PREDNISONE 10 MG/1
TABLET ORAL
Qty: 33 TABLET | Refills: 0 | Status: SHIPPED | OUTPATIENT
Start: 2022-10-31 | End: 2022-10-31 | Stop reason: SDUPTHER

## 2022-10-31 RX ORDER — METRONIDAZOLE 500 MG/1
500 TABLET ORAL EVERY 8 HOURS
Qty: 21 TABLET | Refills: 0 | Status: SHIPPED | OUTPATIENT
Start: 2022-10-31 | End: 2022-11-07

## 2022-10-31 RX ORDER — OSELTAMIVIR PHOSPHATE 75 MG/1
75 CAPSULE ORAL 2 TIMES DAILY
Qty: 4 CAPSULE | Refills: 0 | Status: SHIPPED | OUTPATIENT
Start: 2022-10-31 | End: 2022-11-02

## 2022-10-31 RX ORDER — POTASSIUM CHLORIDE 20 MEQ/1
40 TABLET, EXTENDED RELEASE ORAL ONCE
Status: COMPLETED | OUTPATIENT
Start: 2022-10-31 | End: 2022-10-31

## 2022-10-31 RX ORDER — IPRATROPIUM BROMIDE AND ALBUTEROL SULFATE 2.5; .5 MG/3ML; MG/3ML
3 SOLUTION RESPIRATORY (INHALATION) EVERY 4 HOURS PRN
Qty: 75 ML | Refills: 1 | Status: SHIPPED | OUTPATIENT
Start: 2022-10-31 | End: 2023-10-31

## 2022-10-31 RX ORDER — MAGNESIUM SULFATE HEPTAHYDRATE 40 MG/ML
2 INJECTION, SOLUTION INTRAVENOUS ONCE
Status: COMPLETED | OUTPATIENT
Start: 2022-10-31 | End: 2022-10-31

## 2022-10-31 RX ORDER — FLUTICASONE PROPIONATE AND SALMETEROL 250; 50 UG/1; UG/1
1 POWDER RESPIRATORY (INHALATION) 2 TIMES DAILY
Qty: 60 EACH | Refills: 3 | Status: SHIPPED | OUTPATIENT
Start: 2022-10-31 | End: 2023-10-31

## 2022-10-31 RX ADMIN — BUDESONIDE 0.5 MG: 0.5 INHALANT ORAL at 08:10

## 2022-10-31 RX ADMIN — METRONIDAZOLE 500 MG: 500 TABLET ORAL at 06:10

## 2022-10-31 RX ADMIN — IPRATROPIUM BROMIDE AND ALBUTEROL SULFATE 3 ML: 2.5; .5 SOLUTION RESPIRATORY (INHALATION) at 08:10

## 2022-10-31 RX ADMIN — METHYLPREDNISOLONE SODIUM SUCCINATE 40 MG: 40 INJECTION, POWDER, FOR SOLUTION INTRAMUSCULAR; INTRAVENOUS at 12:10

## 2022-10-31 RX ADMIN — POTASSIUM CHLORIDE 40 MEQ: 1500 TABLET, EXTENDED RELEASE ORAL at 08:10

## 2022-10-31 RX ADMIN — OSELTAMIVIR PHOSPHATE 75 MG: 75 CAPSULE ORAL at 08:10

## 2022-10-31 RX ADMIN — METHYLPREDNISOLONE SODIUM SUCCINATE 40 MG: 40 INJECTION, POWDER, FOR SOLUTION INTRAMUSCULAR; INTRAVENOUS at 06:10

## 2022-10-31 RX ADMIN — AMLODIPINE BESYLATE 5 MG: 5 TABLET ORAL at 08:10

## 2022-10-31 RX ADMIN — MAGNESIUM SULFATE HEPTAHYDRATE 2 G: 40 INJECTION, SOLUTION INTRAVENOUS at 08:10

## 2022-10-31 RX ADMIN — PANTOPRAZOLE SODIUM 40 MG: 40 INJECTION, POWDER, FOR SOLUTION INTRAVENOUS at 08:10

## 2022-10-31 RX ADMIN — IPRATROPIUM BROMIDE AND ALBUTEROL SULFATE 3 ML: 2.5; .5 SOLUTION RESPIRATORY (INHALATION) at 12:10

## 2022-10-31 RX ADMIN — CIPROFLOXACIN 400 MG: 2 INJECTION, SOLUTION INTRAVENOUS at 04:10

## 2022-10-31 RX ADMIN — HYDRALAZINE HYDROCHLORIDE 10 MG: 20 INJECTION, SOLUTION INTRAMUSCULAR; INTRAVENOUS at 08:10

## 2022-10-31 RX ADMIN — LISINOPRIL 10 MG: 10 TABLET ORAL at 08:10

## 2022-10-31 NOTE — PLAN OF CARE
[facility] - Telemetry (Hospital)  Discharge Final Note    Primary Care Provider: Primary Doctor No    Expected Discharge Date: 10/31/2022    Final Discharge Note (most recent)       Final Note - 10/31/22 0959          Final Note    Assessment Type Final Discharge Note     Anticipated Discharge Disposition Home or Self Care        Post-Acute Status    Post-Acute Authorization [facility] Status Set-up Complete/Auth obtained   [facility] nebulizer                    Important Message from Medicare             Contact Info       No, Primary Doctor   Relationship: PCP - General        Next Steps: Follow up    [facility]        Next Steps: Follow up    Instructions: [facility] clinic will call you with an appointment for the Pulmonology Clinic and Gastroenterology Clinic.      [phone]

## 2022-10-31 NOTE — PLAN OF CARE
[facility] referral to Pulmonology and GI clinic faxed with H&P and Pulmonary, GI consults and notes.

## 2022-10-31 NOTE — DISCHARGE SUMMARY
[facility] - Telemetry ([facility] Medicine  Discharge Summary      Patient Name: Lucero Townsend  MRN: [id number]  Patient Class: IP- Inpatient  Admission Date: 10/27/2022  Hospital Length of Stay: 2 days  Discharge Date and Time:  10/31/2022 9:53 AM  Attending Physician: Juan Gasca, *   Discharging Provider: Juan Gasca MD  Primary Care Provider: Primary Doctor No      HPI:   The pt is a 60 yo female with PMHx significant for Asthma and tobacco abuse presented to the ED for evaluation of 4 days h/o cough, chest congestion,SOB, wheezing,  fever, chills, abdominal pain and cramp across the lower abd , nausea , vomiting and multiple episodes of diarrhea throughout the day with dark, loose stools. Denies chest pain, Pt was seen at [facility] on 10/25/22 for similar symptoms and diagnosed with colitis and asthma  and was discharged  home on oral Cipro /Flagyl and medrol dosepak. Pt returns here today and reports symptoms did not improve and now can't keep food down due to vomiting and still having diarrhea. Additionally pt is noted to have positive Influenza A. Unsure of sick contact and no recent travel. She is a current everyday smoker , denies drinking alcohol. Vitals on presentation - 140/90, P-93, RR-30, SpO2 99% on 3 L, T-98.9. Labs - WBC 9.1, K 2.3, CO2 17, Cr 0.9, Lipase normal, LA 5.8, BNP 60, Troponin 0.009, PCT 0.05, CXR - clear lungs, CT abd/pelvis - mild thickening of the transverse and descending colon suggestive of colitis. Pt received 30ml/kg bolus fluid, Cipro, Flagyl , Solumedrol 125 mg in the ED and HM consulted for admission.     Pt will be placed in observation under HM service for sepsis due to colitis and influenza, Severe hypokalemia due to gastroenteritis and vomiting, and Metabolic acidosis/ Lactic acidosis.       * No surgery found *      Hospital Course:   Patient admitted for sepsis due to gastroenteritis and colitis and started on IV cipro/flagyl. Stool studies  pending. GI consult. Serum potassium 2.6 and magnesium 1.0. Will replete. Blood pressure elevated will start lisinopril. 10/29/22: Wheezing continued despite treatment. Pulmonology consult. CMP pending, will replace electrolytes as needed. Patient tolerating diet.     10/30 She report fee;ling better . Cont with b/l wheezing and sob on minimal exertion . CT chest caryn cute abnormality . Jovanny for c.diff negative . GI rec to cont antibiotic and outpt c-scope     10/31 Pt was seen and examined at bedside . She was determined to be suitable for d/c .   She con with mild B/L  wheezing  which has improved since admission . She did not qualify for home o2 .  She is requesting to go home .  She will be d/c on 2 week  prednisone taper , Advair ,  albuterol inhaler and  7 days course of cipro and metronidazole . The hypokalemia and hypomagnesemia were corrected before d/c  with Kdur 40 meq po x1 and 2 gr of magnesium sulfate  IV x 1  . Pt will be d/c on  losartan 25 mg po qd . CM  consulted for [facility]  referral for GI and PCP clinic .        Goals of Care Treatment Preferences:  Code Status: Full Code      Consults:   Consults (From admission, onward)        Status Ordering Provider     Inpatient consult to Pulmonology  Once        Provider:  Jesus Verma MD    Completed AYAKA CHEN     Inpatient consult to Gastroenterology  Once        Provider:  Gautam Patterson MD    Completed ALISE DENNISON          No new Assessment & Plan notes have been filed under this hospital service since the last note was generated.  Service: Hospital Medicine    Final Active Diagnoses:    Diagnosis Date Noted POA    PRINCIPAL PROBLEM:  Sepsis [A41.9] 10/27/2022 Yes    Gastroenteritis and colitis [A08.4] 10/27/2022 Yes    Nausea & vomiting [R11.2] 10/27/2022 Yes    Influenza A infection  [J10.1] 10/27/2022 Yes    Asthma with acute exacerbation [J45.901] 10/27/2022 Yes    Hypokalemia due to excessive gastrointestinal  "loss of potassium [E87.6] 10/27/2022 Yes    Macrocytic anemia [D53.9] 10/27/2022 Yes    Current every day smoker [F17.200] 10/27/2022 Yes    Obesity (BMI 30-39.9) [E66.9] 10/27/2022 Yes      Problems Resolved During this Admission:    Diagnosis Date Noted Date Resolved POA    Metabolic acidosis/ Lactic Acidosis  [E87.20] 10/27/2022 10/29/2022 Yes       Discharged Condition: stable    Disposition: Home or Self Care    Follow Up:   Follow-up Information     Primary Doctor No Follow up.           [facility] Follow up.    Why: [facility] will call you with an appointment for the Pulmonology Clinic and Gastroenterology Clinic.      [phone]                     Patient Instructions:      NEBULIZER FOR HOME USE     Order Specific Question Answer Comments   Height: 5' 9" (1.753 m) DL height   Weight: 104.5 kg (230 lb 6.1 oz)    Does patient have medical equipment at home? none    Length of need (1-99 months): 99      Diet Cardiac     Activity as tolerated       Significant Diagnostic Studies: Labs:   BMP:   Recent Labs   Lab 10/30/22  0520 10/31/22  0448   * 176*    138   K 3.2* 2.9*    100   CO2 25 25   BUN 6 8   CREATININE 0.7 0.7   CALCIUM 8.0* 8.5*   MG 1.5* 1.5*   , CMP   Recent Labs   Lab 10/30/22  0520 10/31/22  0448    138   K 3.2* 2.9*    100   CO2 25 25   * 176*   BUN 6 8   CREATININE 0.7 0.7   CALCIUM 8.0* 8.5*   PROT 5.8* 6.0   ALBUMIN 3.4* 3.4*   BILITOT 0.5 0.5   ALKPHOS 54* 53*   AST 13 14   ALT 10 12   ANIONGAP 13 13    and CBC   Recent Labs   Lab 10/30/22  0520 10/31/22  0448   WBC 5.31 6.69   HGB 10.2* 11.0*   HCT 29.3* 32.5*    206     Microbiology:   Blood Culture   Lab Results   Component Value Date    LABBLOO No Growth to date 10/27/2022    LABBLOO No Growth to date 10/27/2022    LABBLOO No Growth to date 10/27/2022    LABBLOO No Growth to date 10/27/2022       Pending Diagnostic Studies:     Procedure Component Value Units Date/Time    " Ethanol [[id number]] Collected: 10/27/22 1726    Order Status: Sent Lab Status: In process Updated: 10/27/22 1727    Specimen: Blood     Ferritin [[id number]] Collected: 10/27/22 1726    Order Status: Sent Lab Status: In process Updated: 10/27/22 1727    Specimen: Blood     Folate [[id number]] Collected: 10/27/22 1726    Order Status: Sent Lab Status: In process Updated: 10/27/22 1727    Specimen: Blood     Iron and TIBC [[id number]] Collected: 10/27/22 1726    Order Status: Sent Lab Status: In process Updated: 10/27/22 1727    Specimen: Blood     Stool Exam-Ova,Cysts,Parasites [[id number]] Collected: 10/28/22 0345    Order Status: Sent Lab Status: In process Updated: 10/28/22 1657    Specimen: Stool     Urinalysis, Reflex to Urine Culture Urine, Clean Catch [[id number]] Collected: 10/27/22 2312    Order Status: Sent Lab Status: In process Updated: 10/27/22 2313    Specimen: Urine     Urinalysis, Reflex to Urine Culture Urine, Clean Catch [[id number]] Collected: 10/27/22 2306    Order Status: Sent Lab Status: In process Updated: 10/27/22 2312    Specimen: Urine     Vitamin B12 [[id number]] Collected: 10/27/22 1726    Order Status: Sent Lab Status: In process Updated: 10/27/22 1727    Specimen: Blood          Medications:  Reconciled Home Medications:      Medication List      START taking these medications    albuterol-ipratropium 2.5 mg-0.5 mg/3 mL nebulizer solution  Commonly known as: DUO-NEB  Take 3 mLs by nebulization every 4 (four) hours as needed for Wheezing. Rescue     fluticasone-salmeterol 250-50 mcg/dose 250-50 mcg/dose diskus inhaler  Commonly known as: ADVAIR DISKUS  Inhale 1 puff into the lungs 2 (two) times daily. Controller     losartan 25 MG tablet  Commonly known as: COZAAR  Take 1 tablet (25 mg total) by mouth once daily.     oseltamivir 75 MG capsule  Commonly known as: TAMIFLU  Take 1 capsule (75 mg total) by mouth 2 (two) times daily. for 2 days     predniSONE 10 MG tablet  Commonly known as:  DELTASONE  Take 4 tablets (40 mg total) by mouth once daily for 3 days, THEN 3 tablets (30 mg total) once daily for 3 days, THEN 2 tablets (20 mg total) once daily for 4 days, THEN 1 tablet (10 mg total) once daily for 4 days.  Start taking on: October 31, 2022        CHANGE how you take these medications    ciprofloxacin HCl 500 MG tablet  Commonly known as: CIPRO  Take 1 tablet (500 mg total) by mouth every 12 (twelve) hours. for 7 days  What changed: when to take this     metroNIDAZOLE 500 MG tablet  Commonly known as: FLAGYL  Take 1 tablet (500 mg total) by mouth every 8 (eight) hours. for 7 days  What changed: when to take this        CONTINUE taking these medications    albuterol 90 mcg/actuation inhaler  Commonly known as: PROVENTIL/VENTOLIN HFA  Inhale 1-2 puffs into the lungs every 6 (six) hours as needed for Wheezing. Rescue        STOP taking these medications    methylPREDNISolone 4 MG Tab  Commonly known as: MEDROL            Indwelling Lines/Drains at time of discharge:   Lines/Drains/Airways     None                 Time spent on the discharge of patient: 31 minutes         Juan Gasca MD  Department of Hospital Medicine  [facility] - Telemetry ([facility])

## 2022-10-31 NOTE — PROGRESS NOTES
Home Oxygen Evaluation - [facility] - Cardiopulmonary Department      Date Performed: 10/31/2022      1) Patient's Home O2 Sat on room air, while at rest: Room Air SpO2 At Rest: 95 %        If O2 sats on room air at rest are 88% or below, patient qualifies.  Document O2 liter flow needed in Step 2.  If O2 sats are 89% or above, complete Step 3.        2)  If patient is not ambulated and O2 sats are <88%, what is the O2 liter flow required to meet ordered saturation? Home O2 Eval Comments: refused to walk    If O2 sats on room air while exercising remain 89% or above patient does not qualify, no further testing needed Document N/A in step 3. If O2 sats on room air while exercising are 88% or below, continue to Step 4.    3) Patient's O2 Sat on room air while exercisin) Patient's O2 Sat while exercising on O2:   at           (Must show improvement from #4 for patients to qualify)      Pt refused to walk

## 2022-10-31 NOTE — PLAN OF CARE
Nebulizer ordered for patient and approved. Patient left before nebulizer could be delivered.  [facility] will reach out to patient and have mailed to her home.       10/31/22 0919   Post-Acute Status   Post-Acute Authorization E   E Status Set-up Complete/Auth obtained   Discharge Plan   Discharge Plan A Home

## 2022-10-31 NOTE — PLAN OF CARE
Discharge education and instruction reviewed with patient. Questions answered as of now. LDA's and telemetry box removed per provider order. Patient provided script for discharge medications. Patient refused waiting for inhaler and discharged with other personal belongings.

## 2022-11-01 LAB — O+P STL MICRO: NORMAL

## 2022-11-02 ENCOUNTER — PATIENT OUTREACH (OUTPATIENT)
Dept: ADMINISTRATIVE | Facility: CLINIC | Age: 62
End: 2022-11-02
Payer: MEDICAID

## 2022-11-02 DIAGNOSIS — A08.4 GASTROENTERITIS AND COLITIS, VIRAL: Primary | ICD-10-CM

## 2022-11-02 LAB
BACTERIA BLD CULT: NORMAL
BACTERIA BLD CULT: NORMAL

## 2022-11-02 RX ORDER — SODIUM, POTASSIUM,MAG SULFATES 17.5-3.13G
1 SOLUTION, RECONSTITUTED, ORAL ORAL DAILY
Qty: 1 KIT | Refills: 0 | Status: SHIPPED | OUTPATIENT
Start: 2022-11-02 | End: 2022-11-04

## 2022-11-02 NOTE — PROGRESS NOTES
C3 nurse attempted to contact Lucero Townsend  for a TCC post hospital discharge follow up call. No answer. The patient does not have a scheduled HOSFU appointment, and the pt does not have an [facility] PCP.

## 2022-11-02 NOTE — PROGRESS NOTES
C3 Nurse made second attempt to reach patient for TCC call. Left voicemail asking patient to please call [phone] and leave first name, last name, and , and Bre will return call.

## 2022-11-07 ENCOUNTER — ANESTHESIA EVENT (OUTPATIENT)
Dept: ENDOSCOPY | Facility: HOSPITAL | Age: 62
End: 2022-11-07
Payer: MEDICAID

## 2022-11-07 ENCOUNTER — ANESTHESIA (OUTPATIENT)
Dept: ENDOSCOPY | Facility: HOSPITAL | Age: 62
End: 2022-11-07
Payer: MEDICAID

## 2022-11-07 ENCOUNTER — HOSPITAL ENCOUNTER (OUTPATIENT)
Facility: HOSPITAL | Age: 62
Discharge: HOME | End: 2022-11-07
Attending: INTERNAL MEDICINE | Admitting: INTERNAL MEDICINE
Payer: MEDICAID

## 2022-11-07 DIAGNOSIS — R19.7 DIARRHEA OF PRESUMED INFECTIOUS ORIGIN: Primary | ICD-10-CM

## 2022-11-07 PROCEDURE — 45380 COLONOSCOPY AND BIOPSY: CPT | Mod: ,,, | Performed by: INTERNAL MEDICINE

## 2022-11-07 PROCEDURE — 25000003 PHARM REV CODE 250: Performed by: INTERNAL MEDICINE

## 2022-11-07 PROCEDURE — 37000008 HC ANESTHESIA 1ST 15 MINUTES: Performed by: INTERNAL MEDICINE

## 2022-11-07 PROCEDURE — 27201012 HC FORCEPS, HOT/COLD, DISP: Performed by: INTERNAL MEDICINE

## 2022-11-07 PROCEDURE — 88305 TISSUE EXAM BY PATHOLOGIST: ICD-10-PCS | Mod: 26,,, | Performed by: PATHOLOGY

## 2022-11-07 PROCEDURE — 63600175 PHARM REV CODE 636 W HCPCS: Performed by: NURSE ANESTHETIST, CERTIFIED REGISTERED

## 2022-11-07 PROCEDURE — 45380 COLONOSCOPY AND BIOPSY: CPT | Performed by: INTERNAL MEDICINE

## 2022-11-07 PROCEDURE — 88305 TISSUE EXAM BY PATHOLOGIST: CPT | Mod: 26,,, | Performed by: PATHOLOGY

## 2022-11-07 PROCEDURE — 25000003 PHARM REV CODE 250: Performed by: NURSE ANESTHETIST, CERTIFIED REGISTERED

## 2022-11-07 PROCEDURE — 37000009 HC ANESTHESIA EA ADD 15 MINS: Performed by: INTERNAL MEDICINE

## 2022-11-07 PROCEDURE — 45380 PR COLONOSCOPY,BIOPSY: ICD-10-PCS | Mod: ,,, | Performed by: INTERNAL MEDICINE

## 2022-11-07 PROCEDURE — 00811 ANES LWR INTST NDSC NOS: CPT | Performed by: INTERNAL MEDICINE

## 2022-11-07 PROCEDURE — 88305 TISSUE EXAM BY PATHOLOGIST: CPT | Performed by: PATHOLOGY

## 2022-11-07 RX ORDER — LIDOCAINE HYDROCHLORIDE 20 MG/ML
INJECTION INTRAVENOUS
Status: DISCONTINUED | OUTPATIENT
Start: 2022-11-07 | End: 2022-11-07

## 2022-11-07 RX ORDER — DEXTROMETHORPHAN/PSEUDOEPHED 2.5-7.5/.8
DROPS ORAL
Status: DISCONTINUED | OUTPATIENT
Start: 2022-11-07 | End: 2022-11-07 | Stop reason: HOSPADM

## 2022-11-07 RX ORDER — PROPOFOL 10 MG/ML
VIAL (ML) INTRAVENOUS
Status: DISCONTINUED | OUTPATIENT
Start: 2022-11-07 | End: 2022-11-07

## 2022-11-07 RX ORDER — SODIUM CHLORIDE 9 MG/ML
INJECTION, SOLUTION INTRAVENOUS CONTINUOUS
Status: DISCONTINUED | OUTPATIENT
Start: 2022-11-07 | End: 2022-11-07 | Stop reason: HOSPADM

## 2022-11-07 RX ADMIN — PROPOFOL 30 MG: 10 INJECTION, EMULSION INTRAVENOUS at 07:11

## 2022-11-07 RX ADMIN — Medication 50 MG: at 07:11

## 2022-11-07 RX ADMIN — SODIUM CHLORIDE, SODIUM LACTATE, POTASSIUM CHLORIDE, AND CALCIUM CHLORIDE: .6; .31; .03; .02 INJECTION, SOLUTION INTRAVENOUS at 07:11

## 2022-11-07 RX ADMIN — PROPOFOL 80 MG: 10 INJECTION, EMULSION INTRAVENOUS at 07:11

## 2022-11-07 NOTE — PLAN OF CARE
IV catheter intact upon removal (right hand); site covered with gauze and wrapped with Coban; agrees to remove in 30 minutes.

## 2022-11-07 NOTE — H&P
PRE PROCEDURE H&P    Patient Name: Lucero Townsend  MRN: [id number]  : 1960  Date of Procedure:  2022  Referring Physician: Gautam Patterson *  Primary Physician: Primary Doctor No  Procedure Physician: Brea Burgos MD       Planned Procedure: Colonoscopy  Diagnosis: abnormal CT scan   Chief Complaint: Same as above    HPI: Patient is an 61 y.o. female is here for the above.     Last colonoscopy: no prior   Family history: none   Anticoagulation: none     Past Medical History:   Past Medical History:   Diagnosis Date    Asthma         Past Surgical History:  History reviewed. No pertinent surgical history.     Home Medications:  Prior to Admission medications    Medication Sig Start Date End Date Taking? Authorizing Provider   albuterol-ipratropium (DUO-NEB) 2.5 mg-0.5 mg/3 mL nebulizer solution Take 3 mLs by nebulization every 4 (four) hours as needed for Wheezing. Rescue 10/31/22 10/31/23 Yes Juan Gasca MD   ciprofloxacin HCl (CIPRO) 500 MG tablet Take 1 tablet (500 mg total) by mouth every 12 (twelve) hours. for 7 days 10/31/22 11/7/22 Yes Juan Gasca MD   fluticasone-salmeterol diskus inhaler 250-50 mcg Inhale 1 puff into the lungs 2 (two) times daily. Controller 10/31/22 10/31/23 Yes Juan Gasca MD   losartan (COZAAR) 25 MG tablet Take 1 tablet (25 mg total) by mouth once daily. 10/31/22 10/31/23 Yes Juan Gasca MD   metroNIDAZOLE (FLAGYL) 500 MG tablet Take 1 tablet (500 mg total) by mouth every 8 (eight) hours. for 7 days 10/31/22 11/7/22 Yes Juan Gasca MD   predniSONE (DELTASONE) 10 MG tablet Take 4 tablets (40 mg total) by mouth once daily for 3 days, THEN 3 tablets (30 mg total) once daily for 3 days, THEN 2 tablets (20 mg total) once daily for 4 days, THEN 1 tablet (10 mg total) once daily for 4 days. 10/31/22 11/14/22 Yes Juan Gasca MD   albuterol (PROVENTIL/VENTOLIN HFA) 90 mcg/actuation inhaler Inhale 1-2  "puffs into the lungs every 6 (six) hours as needed for Wheezing. Rescue 11/5/18 10/27/22  Cayla Harry MD        Allergies:  Review of patient's allergies indicates:  No Known Allergies     Social History:   Social History     Socioeconomic History    Marital status: Single   Tobacco Use    Smoking status: Every Day     Packs/day: 1.00     Years: 30.00     Pack years: 30.00     Types: Cigarettes    Smokeless tobacco: Never   Substance and Sexual Activity    Alcohol use: Yes     Comment: occ    Drug use: Never       Family History:  Family History   Problem Relation Age of Onset    Cancer Mother     Diabetes Mother     Diabetes Father        ROS: No acute cardiac events, no acute respiratory complaints.     Physical Exam (all patients):    /79 (BP Location: Left arm, Patient Position: Lying)   Pulse 67   Temp 97.3 °F (36.3 °C) (Temporal)   Resp 15   Ht 5' 9" (1.753 m)   Wt 104.3 kg (230 lb)   SpO2 99%   Breastfeeding No   BMI 33.97 kg/m²   Lungs: Clear to auscultation bilaterally, respirations unlabored  Heart: Regular rate and rhythm, S1 and S2 normal, no obvious murmurs  Abdomen:         Soft, non-tender, bowel sounds normal, no masses, no organomegaly    Lab Results   Component Value Date    WBC 6.69 10/31/2022     (H) 10/31/2022    RDW 17.5 (H) 10/31/2022     10/31/2022    INR 1.1 10/27/2022     (H) 10/31/2022    BUN 8 10/31/2022     10/31/2022    K 2.9 (L) 10/31/2022     10/31/2022        SEDATION PLAN: per anesthesia      History reviewed, vital signs satisfactory, cardiopulmonary status satisfactory, sedation options, risks and plans have been discussed with the patient  All their questions were answered and the patient agrees to the sedation procedures as planned and the patient is deemed an appropriate candidate for the sedation as planned.    Procedure explained to patient, informed consent obtained and placed in chart.    Brea Burgos  11/7/2022  8:18 AM "

## 2022-11-07 NOTE — ANESTHESIA POSTPROCEDURE EVALUATION
Anesthesia Post Evaluation    Patient: Lucero Townsend    Procedure(s) Performed: Procedure(s) (LRB):  COLONOSCOPY (N/A)    Final Anesthesia Type: MAC      Patient location during evaluation: PACU  Patient participation: Yes- Able to Participate  Level of consciousness: awake and alert  Post-procedure vital signs: reviewed and stable  Pain management: adequate  Airway patency: patent    PONV status at discharge: No PONV  Anesthetic complications: no      Cardiovascular status: blood pressure returned to baseline  Respiratory status: unassisted and room air  Hydration status: euvolemic  Follow-up not needed.          Vitals Value Taken Time   /82 11/07/22 0703   Temp 36.4 °C (97.6 °F) 11/07/22 0703   Pulse 81 11/07/22 0703   Resp 18 11/07/22 0703   SpO2 96 % 11/07/22 0703         No case tracking events are documented in the log.      Pain/Toby Score: No data recorded

## 2022-11-07 NOTE — TRANSFER OF CARE
"Anesthesia Transfer of Care Note    Patient: Lucero Townsend    Procedure(s) Performed: Procedure(s) (LRB):  COLONOSCOPY (N/A)    Patient location: PACU    Anesthesia Type: MAC    Transport from OR: Transported from OR on room air with adequate spontaneous ventilation    Post pain: adequate analgesia    Post assessment: no apparent anesthetic complications    Post vital signs: stable    Level of consciousness: responds to stimulation    Nausea/Vomiting: no nausea/vomiting    Complications: none    Transfer of care protocol was followed      Last vitals:   Visit Vitals  /82 (BP Location: Left arm, Patient Position: Lying)   Pulse 81   Temp 36.4 °C (97.6 °F) (Temporal)   Resp 18   Ht 5' 9" (1.753 m)   Wt 104.3 kg (230 lb)   SpO2 96%   Breastfeeding No   BMI 33.97 kg/m²     "

## 2022-11-07 NOTE — PROVATION PATIENT INSTRUCTIONS
Discharge Summary/Instructions after an Endoscopic Procedure  Patient Name: Lucero Townsend  Patient MRN: [id number]  Patient YOB: 1960 Monday, November 7, 2022 Brea Burgos MD  Dear patient,  As a result of recent federal legislation (The Federal Cures Act), you may   receive lab or pathology results from your procedure in your [facility]   account before your physician is able to contact you. Your physician or   their representative will relay the results to you with their   recommendations at their soonest availability.  Thank you,  RESTRICTIONS:  During your procedure today, you received medications for sedation.  These   medications may affect your judgment, balance and coordination.  Therefore,   for 24 hours, you have the following restrictions:   - DO NOT drive a car, operate machinery, make legal/financial decisions,   sign important papers or drink alcohol.    ACTIVITY:  Today: no heavy lifting, straining or running due to procedural   sedation/anesthesia.  The following day: return to full activity including work.  DIET:  Eat and drink normally unless instructed otherwise.     TREATMENT FOR COMMON SIDE EFFECTS:  - Mild abdominal pain, nausea, belching, bloating or excessive gas:  rest,   eat lightly and use a heating pad.  - Sore Throat: treat with throat lozenges and/or gargle with warm salt   water.  - Because air was used during the procedure, expelling large amounts of air   from your rectum or belching is normal.  - If a bowel prep was taken, you may not have a bowel movement for 1-3 days.    This is normal.  SYMPTOMS TO WATCH FOR AND REPORT TO YOUR PHYSICIAN:  1. Abdominal pain or bloating, other than gas cramps.  2. Chest pain.  3. Back pain.  4. Signs of infection such as: chills or fever occurring within 24 hours   after the procedure.  5. Rectal bleeding, which would show as bright red, maroon, or black stools.   (A tablespoon of blood from the rectum is not serious, especially if    hemorrhoids are present.)  6. Vomiting.  7. Weakness or dizziness.  GO DIRECTLY TO THE NEAREST EMERGENCY ROOM IF YOU HAVE ANY OF THE FOLLOWING:      Difficulty breathing              Chills and/or fever over 101 F   Persistent vomiting and/or vomiting blood   Severe abdominal pain   Severe chest pain   Black, tarry stools   Bleeding- more than one tablespoon   Any other symptom or condition that you feel may need urgent attention  Your doctor recommends these additional instructions:  If any biopsies were taken, your doctors clinic will contact you in 1 to 2   weeks with any results.  - Patient has a contact number available for emergencies.  The signs and   symptoms of potential delayed complications were discussed with the   patient.  Return to normal activities tomorrow.  Written discharge   instructions were provided to the patient.   - Discharge patient to home (via wheelchair).   - Resume previous diet today.   - Continue present medications.   - Await pathology results.   - Repeat colonoscopy in 10 years for screening purposes.  For questions, problems or results please call your physician Brea Burgos MD at Work:  [phone]  If you have any questions about the above instructions, call the GI   department at [phone] or call the endoscopy unit at [phone]   from 7am until 3 pm.  [facility] [place], EMERGENCY ROOM PHONE NUMBER:   [phone]  IF A COMPLICATION OR EMERGENCY SITUATION ARISES AND YOU ARE UNABLE TO REACH   YOUR PHYSICIAN - GO DIRECTLY TO THE EMERGENCY ROOM.  I have read or have had read to me these discharge instructions for my   procedure and have received a written copy.  I understand these   instructions and will follow-up with my physician if I have any questions.     __________________________________       _____________________________________  Nurse Signature                                          Patient/Designated   Responsible Party  Signature  MD Brea Park MD  11/7/2022 8:09:03 AM  This report has been verified and signed electronically.  Dear patient,  As a result of recent federal legislation (The Federal Cures Act), you may   receive lab or pathology results from your procedure in your [facility]   account before your physician is able to contact you. Your physician or   their representative will relay the results to you with their   recommendations at their soonest availability.  Thank you,  PROVATION

## 2022-11-07 NOTE — ANESTHESIA PREPROCEDURE EVALUATION
11/07/2022  Lucero Townsend is a 61 y.o., female.      Pre-op Assessment    I have reviewed the Patient Summary Reports.     I have reviewed the Nursing Notes. I have reviewed the NPO Status.   I have reviewed the Medications.     Review of Systems  Anesthesia Hx:  No problems with previous Anesthesia  Denies Family Hx of Anesthesia complications.   Denies Personal Hx of Anesthesia complications.   Social:  Smoker    Hematology/Oncology:  Hematology Normal   Oncology Normal     EENT/Dental:EENT/Dental Normal   Cardiovascular:  Cardiovascular Normal Exercise tolerance: good     Pulmonary:   Asthma mild    Renal/:  Renal/ Normal     Hepatic/GI:  Hepatic/GI Normal    Musculoskeletal:  Musculoskeletal Normal    Neurological:  Neurology Normal    Endocrine:  Obesity / BMI > 30  Dermatological:  Skin Normal    Psych:  Psychiatric Normal           Physical Exam  General: Cooperative, Alert and Oriented    Airway:  Mallampati: II   Mouth Opening: Normal  TM Distance: Normal  Tongue: Normal  Neck ROM: Normal ROM    Dental:  Intact    Chest/Lungs:  Clear to auscultation, Normal Respiratory Rate    Heart:  Rate: Normal  Rhythm: Regular Rhythm        Anesthesia Plan  Type of Anesthesia, risks & benefits discussed:    Anesthesia Type: MAC  Intra-op Monitoring Plan: Standard ASA Monitors  Induction:  IV  Informed Consent: Informed consent signed with the Patient and all parties understand the risks and agree with anesthesia plan.  All questions answered.   ASA Score: 2  Day of Surgery Review of History & Physical: H&P Update referred to the surgeon/provider.I have interviewed and examined the patient. I have reviewed the patient's H&P dated: There are no significant changes.     Ready For Surgery From Anesthesia Perspective.     .

## 2022-11-08 VITALS
BODY MASS INDEX: 34.07 KG/M2 | HEART RATE: 73 BPM | TEMPERATURE: 97 F | WEIGHT: 230 LBS | RESPIRATION RATE: 7 BRPM | HEIGHT: 69 IN | DIASTOLIC BLOOD PRESSURE: 86 MMHG | OXYGEN SATURATION: 97 % | SYSTOLIC BLOOD PRESSURE: 157 MMHG

## 2022-11-11 LAB
FINAL PATHOLOGIC DIAGNOSIS: NORMAL
GROSS: NORMAL
Lab: NORMAL

## 2023-01-30 PROBLEM — A41.9 SEPSIS: Status: RESOLVED | Noted: 2022-10-27 | Resolved: 2023-01-30

## 2023-06-13 ENCOUNTER — HOSPITAL ENCOUNTER (EMERGENCY)
Facility: HOSPITAL | Age: 63
Discharge: HOME | End: 2023-06-13
Attending: EMERGENCY MEDICINE
Payer: MEDICAID

## 2023-06-13 VITALS
DIASTOLIC BLOOD PRESSURE: 77 MMHG | WEIGHT: 202.94 LBS | RESPIRATION RATE: 22 BRPM | HEART RATE: 72 BPM | BODY MASS INDEX: 29.97 KG/M2 | OXYGEN SATURATION: 96 % | TEMPERATURE: 99 F | SYSTOLIC BLOOD PRESSURE: 162 MMHG

## 2023-06-13 DIAGNOSIS — J45.41 MODERATE PERSISTENT ASTHMA WITH EXACERBATION: Primary | ICD-10-CM

## 2023-06-13 DIAGNOSIS — R05.9 COUGH: ICD-10-CM

## 2023-06-13 DIAGNOSIS — B34.9 NONSPECIFIC SYNDROME SUGGESTIVE OF VIRAL ILLNESS: ICD-10-CM

## 2023-06-13 LAB
ALBUMIN SERPL BCP-MCNC: 4.3 G/DL (ref 3.5–5.2)
ALP SERPL-CCNC: 99 U/L (ref 55–135)
ALT SERPL W/O P-5'-P-CCNC: 13 U/L (ref 10–44)
ANION GAP SERPL CALC-SCNC: 18 MMOL/L (ref 8–16)
AST SERPL-CCNC: 31 U/L (ref 10–40)
BASOPHILS # BLD AUTO: 0.04 K/UL (ref 0–0.2)
BASOPHILS NFR BLD: 0.9 % (ref 0–1.9)
BILIRUB SERPL-MCNC: 1.1 MG/DL (ref 0.1–1)
BUN SERPL-MCNC: 6 MG/DL (ref 6–20)
CALCIUM SERPL-MCNC: 8.9 MG/DL (ref 8.7–10.5)
CHLORIDE SERPL-SCNC: 107 MMOL/L (ref 95–110)
CO2 SERPL-SCNC: 16 MMOL/L (ref 23–29)
CREAT SERPL-MCNC: 0.7 MG/DL (ref 0.5–1.4)
DIFFERENTIAL METHOD: ABNORMAL
EOSINOPHIL # BLD AUTO: 0.1 K/UL (ref 0–0.5)
EOSINOPHIL NFR BLD: 1.3 % (ref 0–8)
ERYTHROCYTE [DISTWIDTH] IN BLOOD BY AUTOMATED COUNT: 15 % (ref 11.5–14.5)
EST. GFR  (NO RACE VARIABLE): >60 ML/MIN/1.73 M^2
GLUCOSE SERPL-MCNC: 119 MG/DL (ref 70–110)
HCT VFR BLD AUTO: 42.4 % (ref 37–48.5)
HGB BLD-MCNC: 14.2 G/DL (ref 12–16)
IMM GRANULOCYTES # BLD AUTO: 0.01 K/UL (ref 0–0.04)
IMM GRANULOCYTES NFR BLD AUTO: 0.2 % (ref 0–0.5)
LIPASE SERPL-CCNC: 12 U/L (ref 4–60)
LYMPHOCYTES # BLD AUTO: 1.7 K/UL (ref 1–4.8)
LYMPHOCYTES NFR BLD: 37.9 % (ref 18–48)
MCH RBC QN AUTO: 33.9 PG (ref 27–31)
MCHC RBC AUTO-ENTMCNC: 33.5 G/DL (ref 32–36)
MCV RBC AUTO: 101 FL (ref 82–98)
MONOCYTES # BLD AUTO: 0.5 K/UL (ref 0.3–1)
MONOCYTES NFR BLD: 10.6 % (ref 4–15)
NEUTROPHILS # BLD AUTO: 2.2 K/UL (ref 1.8–7.7)
NEUTROPHILS NFR BLD: 49.1 % (ref 38–73)
NRBC BLD-RTO: 0 /100 WBC
PLATELET # BLD AUTO: 282 K/UL (ref 150–450)
PMV BLD AUTO: 8.9 FL (ref 9.2–12.9)
POTASSIUM SERPL-SCNC: 3.1 MMOL/L (ref 3.5–5.1)
PROT SERPL-MCNC: 7.5 G/DL (ref 6–8.4)
RBC # BLD AUTO: 4.19 M/UL (ref 4–5.4)
SARS-COV-2 RDRP RESP QL NAA+PROBE: NEGATIVE
SODIUM SERPL-SCNC: 141 MMOL/L (ref 136–145)
WBC # BLD AUTO: 4.54 K/UL (ref 3.9–12.7)

## 2023-06-13 PROCEDURE — 96361 HYDRATE IV INFUSION ADD-ON: CPT

## 2023-06-13 PROCEDURE — 96374 THER/PROPH/DIAG INJ IV PUSH: CPT

## 2023-06-13 PROCEDURE — 80053 COMPREHEN METABOLIC PANEL: CPT | Performed by: EMERGENCY MEDICINE

## 2023-06-13 PROCEDURE — 83690 ASSAY OF LIPASE: CPT | Performed by: EMERGENCY MEDICINE

## 2023-06-13 PROCEDURE — 85025 COMPLETE CBC W/AUTO DIFF WBC: CPT | Performed by: EMERGENCY MEDICINE

## 2023-06-13 PROCEDURE — 63600175 PHARM REV CODE 636 W HCPCS: Performed by: EMERGENCY MEDICINE

## 2023-06-13 PROCEDURE — 25000242 PHARM REV CODE 250 ALT 637 W/ HCPCS: Performed by: EMERGENCY MEDICINE

## 2023-06-13 PROCEDURE — U0002 COVID-19 LAB TEST NON-CDC: HCPCS | Performed by: EMERGENCY MEDICINE

## 2023-06-13 PROCEDURE — 96375 TX/PRO/DX INJ NEW DRUG ADDON: CPT

## 2023-06-13 PROCEDURE — 25000003 PHARM REV CODE 250: Performed by: EMERGENCY MEDICINE

## 2023-06-13 PROCEDURE — 99285 EMERGENCY DEPT VISIT HI MDM: CPT | Mod: 25

## 2023-06-13 PROCEDURE — 94761 N-INVAS EAR/PLS OXIMETRY MLT: CPT

## 2023-06-13 PROCEDURE — 94640 AIRWAY INHALATION TREATMENT: CPT | Mod: XB

## 2023-06-13 RX ORDER — ONDANSETRON 4 MG/1
4 TABLET, ORALLY DISINTEGRATING ORAL EVERY 8 HOURS PRN
Qty: 18 TABLET | Refills: 0 | Status: SHIPPED | OUTPATIENT
Start: 2023-06-13 | End: 2023-06-20

## 2023-06-13 RX ORDER — PREDNISONE 50 MG/1
50 TABLET ORAL DAILY
Qty: 5 TABLET | Refills: 0 | Status: SHIPPED | OUTPATIENT
Start: 2023-06-13 | End: 2023-06-18

## 2023-06-13 RX ORDER — AMLODIPINE BESYLATE 5 MG/1
5 TABLET ORAL DAILY
Qty: 30 TABLET | Refills: 0 | Status: SHIPPED | OUTPATIENT
Start: 2023-06-13 | End: 2023-07-13

## 2023-06-13 RX ORDER — ALBUTEROL SULFATE 90 UG/1
1-2 AEROSOL, METERED RESPIRATORY (INHALATION) EVERY 6 HOURS PRN
Qty: 8 G | Refills: 0 | Status: SHIPPED | OUTPATIENT
Start: 2023-06-13 | End: 2024-06-12

## 2023-06-13 RX ORDER — IPRATROPIUM BROMIDE AND ALBUTEROL SULFATE 2.5; .5 MG/3ML; MG/3ML
3 SOLUTION RESPIRATORY (INHALATION)
Status: COMPLETED | OUTPATIENT
Start: 2023-06-13 | End: 2023-06-13

## 2023-06-13 RX ORDER — ONDANSETRON 2 MG/ML
8 INJECTION INTRAMUSCULAR; INTRAVENOUS
Status: COMPLETED | OUTPATIENT
Start: 2023-06-13 | End: 2023-06-13

## 2023-06-13 RX ORDER — POTASSIUM CHLORIDE 20 MEQ/1
40 TABLET, EXTENDED RELEASE ORAL
Status: COMPLETED | OUTPATIENT
Start: 2023-06-13 | End: 2023-06-13

## 2023-06-13 RX ORDER — METHYLPREDNISOLONE SOD SUCC 125 MG
125 VIAL (EA) INJECTION
Status: COMPLETED | OUTPATIENT
Start: 2023-06-13 | End: 2023-06-13

## 2023-06-13 RX ADMIN — POTASSIUM CHLORIDE 40 MEQ: 1500 TABLET, EXTENDED RELEASE ORAL at 12:06

## 2023-06-13 RX ADMIN — METHYLPREDNISOLONE SODIUM SUCCINATE 125 MG: 125 INJECTION, POWDER, FOR SOLUTION INTRAMUSCULAR; INTRAVENOUS at 09:06

## 2023-06-13 RX ADMIN — IPRATROPIUM BROMIDE AND ALBUTEROL SULFATE 3 ML: .5; 3 SOLUTION RESPIRATORY (INHALATION) at 12:06

## 2023-06-13 RX ADMIN — IPRATROPIUM BROMIDE AND ALBUTEROL SULFATE 3 ML: .5; 3 SOLUTION RESPIRATORY (INHALATION) at 09:06

## 2023-06-13 RX ADMIN — SODIUM CHLORIDE, POTASSIUM CHLORIDE, SODIUM LACTATE AND CALCIUM CHLORIDE 1000 ML: 600; 310; 30; 20 INJECTION, SOLUTION INTRAVENOUS at 09:06

## 2023-06-13 RX ADMIN — ONDANSETRON 8 MG: 2 INJECTION INTRAMUSCULAR; INTRAVENOUS at 09:06

## 2023-06-13 NOTE — ED PROVIDER NOTES
SCRIBE #1 NOTE: I, Radha Fernandez, am scribing for, and in the presence of, Francesco Alonzo MD. I have scribed the entire note.       History     Chief Complaint   Patient presents with    Abdominal Pain     Pt reports generalized abdominal pain, diarrhea X3 days, states has been coughing up thick green mucus, reports subjective fever and chills at home, states had the flu a few months ago but that got better     HPI  6/13/2023, 8:57 AM  History obtained from the patient    HPI:  Lucero Townsend is a 62 y.o. female with a PMH of asthma who presents to the [facility] emergency department for evaluation of abdominal pain which onset 3 days PTA. Pt reports having the flu a few months ago but that it was resolved before these symptoms began. Associated symptoms include fever, chills, diarrhea, vomiting, and productive cough. No prior treatment reported. No other complaints or concerns.     Arrival mode: Personal vehicle   PCP: Primary Doctor No    Review of patient's allergies indicates:  No Known Allergies   Past Medical History:   Diagnosis Date    Asthma      Past Surgical History:   Procedure Laterality Date    COLONOSCOPY N/A 11/7/2022    Procedure: COLONOSCOPY;  Surgeon: Brea Burgos MD;  Location: [facility];  Service: Endoscopy;  Laterality: N/A;       Family History   Problem Relation Age of Onset    Cancer Mother     Diabetes Mother     Diabetes Father      Social History     Tobacco Use    Smoking status: Every Day     Packs/day: 1.00     Years: 30.00     Pack years: 30.00     Types: Cigarettes    Smokeless tobacco: Never   Substance and Sexual Activity    Alcohol use: Yes     Comment: occ    Drug use: Never    Sexual activity: Not on file      Review of Systems     Review of Systems   Constitutional:  Positive for chills and fever.   HENT: Negative.     Eyes: Negative.    Respiratory:  Positive for cough (productive).    Cardiovascular: Negative.    Gastrointestinal:  Positive for  abdominal pain, diarrhea, nausea and vomiting.   Endocrine: Negative.    Genitourinary: Negative.    Musculoskeletal: Negative.    Skin: Negative.    Allergic/Immunologic: Negative.    Neurological: Negative.    Hematological: Negative.    Psychiatric/Behavioral: Negative.        Physical Exam     Initial Vitals [06/13/23 0743]   BP Pulse Resp Temp SpO2   (!) 176/80 76 20 98.7 °F (37.1 °C) 96 %      MAP       --          Physical Exam  Nursing notes and vital signs reviewed.  Constitutional: Patient is in no distress. Shaking chills during exam.  Head: Normocephalic. Atraumatic.   Eyes: Conjunctivae are not pale. No scleral icterus.   ENT: Mucous membranes moist.   Neck: Supple.   Cardiovascular: Regular rate. Regular rhythm.   Pulmonary: Diffuse crackles worst in the left lower lobe. Diffuse expiratory wheezing.  Abdominal: Non-distended. Mild generalized tenderness without rebound or guarding.  Musculoskeletal: Moves all extremities. No obvious deformities. No edema.   Skin: Warm and dry.   Neurological:  Alert, awake, and appropriate. Normal speech. No acute lateralizing neurologic deficits appreciated.   Psychiatric: Normal affect.       ED Course   Procedures  Vitals:    06/13/23 0743 06/13/23 0843 06/13/23 0901 06/13/23 0906   BP: (!) 176/80 132/88     Pulse: 76 77 73 68   Resp: 20  20 20   Temp: 98.7 °F (37.1 °C)      TempSrc: Oral      SpO2: 96% 99% 98% 98%   Weight: 92 kg (202 lb 14.9 oz)       06/13/23 0911 06/13/23 0958 06/13/23 1046 06/13/23 1122   BP:  (!) 164/80 (!) 156/70 (!) 163/93   Pulse: 66 68 69 78   Resp: 20  (!) 22 (!) 21   Temp:       TempSrc:       SpO2: 98% 98% 98% 96%   Weight:        06/13/23 1205 06/13/23 1215   BP:  (!) 162/77   Pulse: 66 72   Resp: 18 (!) 22   Temp:     TempSrc:     SpO2:  96%   Weight:       Lab Results Interpreted as Abnormal:  Labs Reviewed   CBC W/ AUTO DIFFERENTIAL - Abnormal; Notable for the following components:       Result Value     (*)     MCH 33.9 (*)      RDW 15.0 (*)     MPV 8.9 (*)     All other components within normal limits   COMPREHENSIVE METABOLIC PANEL - Abnormal; Notable for the following components:    Potassium 3.1 (*)     CO2 16 (*)     Glucose 119 (*)     Total Bilirubin 1.1 (*)     Anion Gap 18 (*)     All other components within normal limits   SARS-COV-2 RNA AMPLIFICATION, QUAL   LIPASE      All Lab Results:  Results for orders placed or performed during the hospital encounter of 06/13/23   COVID-19 Rapid Screening   Result Value Ref Range    SARS-CoV-2 RNA, Amplification, Qual Negative Negative   CBC auto differential   Result Value Ref Range    WBC 4.54 3.90 - 12.70 K/uL    RBC 4.19 4.00 - 5.40 M/uL    Hemoglobin 14.2 12.0 - 16.0 g/dL    Hematocrit 42.4 37.0 - 48.5 %     (H) 82 - 98 fL    MCH 33.9 (H) 27.0 - 31.0 pg    MCHC 33.5 32.0 - 36.0 g/dL    RDW 15.0 (H) 11.5 - 14.5 %    Platelets 282 150 - 450 K/uL    MPV 8.9 (L) 9.2 - 12.9 fL    Immature Granulocytes 0.2 0.0 - 0.5 %    Gran # (ANC) 2.2 1.8 - 7.7 K/uL    Immature Grans (Abs) 0.01 0.00 - 0.04 K/uL    Lymph # 1.7 1.0 - 4.8 K/uL    Mono # 0.5 0.3 - 1.0 K/uL    Eos # 0.1 0.0 - 0.5 K/uL    Baso # 0.04 0.00 - 0.20 K/uL    nRBC 0 0 /100 WBC    Gran % 49.1 38.0 - 73.0 %    Lymph % 37.9 18.0 - 48.0 %    Mono % 10.6 4.0 - 15.0 %    Eosinophil % 1.3 0.0 - 8.0 %    Basophil % 0.9 0.0 - 1.9 %    Differential Method Automated    Comprehensive metabolic panel   Result Value Ref Range    Sodium 141 136 - 145 mmol/L    Potassium 3.1 (L) 3.5 - 5.1 mmol/L    Chloride 107 95 - 110 mmol/L    CO2 16 (L) 23 - 29 mmol/L    Glucose 119 (H) 70 - 110 mg/dL    BUN 6 6 - 20 mg/dL    Creatinine 0.7 0.5 - 1.4 mg/dL    Calcium 8.9 8.7 - 10.5 mg/dL    Total Protein 7.5 6.0 - 8.4 g/dL    Albumin 4.3 3.5 - 5.2 g/dL    Total Bilirubin 1.1 (H) 0.1 - 1.0 mg/dL    Alkaline Phosphatase 99 55 - 135 U/L    AST 31 10 - 40 U/L    ALT 13 10 - 44 U/L    Anion Gap 18 (H) 8 - 16 mmol/L    eGFR >60 >60 mL/min/1.73 m^2   Lipase    Result Value Ref Range    Lipase 12 4 - 60 U/L     Imaging Results              X-Ray Chest AP Portable (Final result)  Result time 06/13/23 09:19:03      Final result by DAVID Davison Sr., MD (06/13/23 09:19:03)                   Impression:      1. There is no evidence of an acute pulmonary process.  2. There are calcified right paratracheal and right perihilar lymph nodes.  This is characteristic of a prior granulomatous infection.  .      Electronically signed by: Hernan Davison MD  Date:    06/13/2023  Time:    09:19               Narrative:    EXAMINATION:  XR CHEST AP PORTABLE    CLINICAL HISTORY:  Cough, unspecified    COMPARISON:  10/27/2022    FINDINGS:  The size of the heart is normal.  There are calcified right paratracheal and right perihilar lymph nodes.  There is no evidence of an acute pulmonary process.  There is no pneumothorax.  The costophrenic angles are sharp.                                     ED Physician's independent review of the above imaging: agree with radiologist        The emergency physician reviewed the vital signs and test results, which are outlined above.     ED Discussion       11:51 AM:  Patient's evaluation in the ED does not suggest any emergent or life-threatening medical conditions requiring immediate intervention beyond what was provided in the ED, and I believe patient is safe for discharge.  Regardless, an unremarkable evaluation in the ED does not preclude the development or presence of a serious or life-threatening condition. As such, patient was given return instructions for any change or worsening of symptoms.     Medical Decision Making:   Clinical Tests:   Lab Tests: Ordered and Reviewed  Radiological Study: Ordered and Reviewed       ED Medication(s):  Medications   lactated ringers bolus 1,000 mL (0 mLs Intravenous Stopped 6/13/23 1005)   albuterol-ipratropium 2.5 mg-0.5 mg/3 mL nebulizer solution 3 mL (3 mLs Nebulization Given 6/13/23 0711)    methylPREDNISolone sodium succinate injection 125 mg (125 mg Intravenous Given 6/13/23 0907)   ondansetron injection 8 mg (8 mg Intravenous Given 6/13/23 0904)   potassium chloride SA CR tablet 40 mEq (40 mEq Oral Given 6/13/23 1221)   albuterol-ipratropium 2.5 mg-0.5 mg/3 mL nebulizer solution 3 mL (3 mLs Nebulization Given 6/13/23 1205)     Discharge Medication List as of 6/13/2023 11:52 AM        START taking these medications    Details   amLODIPine (NORVASC) 5 MG tablet Take 1 tablet (5 mg total) by mouth once daily., Starting Tue 6/13/2023, Until u 7/13/2023, Normal      ondansetron (ZOFRAN-ODT) 4 MG TbDL Take 1 tablet (4 mg total) by mouth every 8 (eight) hours as needed (nausea/vomiting)., Starting Tue 6/13/2023, Until Tue 6/20/2023 at 2359, Normal      predniSONE (DELTASONE) 50 MG Tab Take 1 tablet (50 mg total) by mouth once daily. for 5 days, Starting Tue 6/13/2023, Until Sun 6/18/2023, Normal           Prescription Management: I performed a review of the patient's current Rx medication list as input by nursing staff.     Follow-up Information       with pcp. Schedule an appointment as soon as possible for a visit in 3 days.               [facility] - Emergency Dept..    Specialty: Emergency Medicine  Why: As needed, If symptoms worsen  Contact information:  [address] [place] [id number] [phone]                           Scribe Attestation:   Scribe #1: I performed the above scribed service and the documentation accurately describes the services I performed. I attest to the accuracy of the note.     Attending:   Physician Attestation Statement for Scribe #1: I, Francesco Alonzo MD, personally performed the services described in this documentation, as scribed by Radha Fernandez, in my presence, and it is both accurate and complete. As with other dictation methods such as dictation software, small errors or inconsistencies may be overlooked due to the goal of spending  more face-to-face time with patients.      Clinical Impression       ICD-10-CM ICD-9-CM   1. Moderate persistent asthma with exacerbation  J45.41 493.92   2. Cough  R05.9 786.2   3. Nonspecific syndrome suggestive of viral illness  B34.9 079.99      ED Disposition Condition    Discharge Stable               Francesco Alonzo MD  06/15/23 [id number]

## 2023-06-13 NOTE — Clinical Note
"Lucero Laneian" Kristian was seen and treated in our emergency department on 6/13/2023.  She may return to work on 06/14/2023.       If you have any questions or concerns, please don't hesitate to call.      Pauline NIETO    "

## 2023-07-01 ENCOUNTER — HOSPITAL ENCOUNTER (EMERGENCY)
Facility: HOSPITAL | Age: 63
Discharge: HOME | End: 2023-07-01
Attending: EMERGENCY MEDICINE
Payer: MEDICAID

## 2023-07-01 VITALS
SYSTOLIC BLOOD PRESSURE: 100 MMHG | DIASTOLIC BLOOD PRESSURE: 59 MMHG | RESPIRATION RATE: 20 BRPM | OXYGEN SATURATION: 97 % | HEART RATE: 99 BPM | TEMPERATURE: 98 F

## 2023-07-01 DIAGNOSIS — M25.569 KNEE PAIN: ICD-10-CM

## 2023-07-01 DIAGNOSIS — M25.562 ACUTE PAIN OF LEFT KNEE: Primary | ICD-10-CM

## 2023-07-01 DIAGNOSIS — M17.12 PRIMARY OSTEOARTHRITIS OF LEFT KNEE: ICD-10-CM

## 2023-07-01 PROCEDURE — 25000003 PHARM REV CODE 250: Performed by: NURSE PRACTITIONER

## 2023-07-01 PROCEDURE — 99284 EMERGENCY DEPT VISIT MOD MDM: CPT

## 2023-07-01 RX ORDER — HYDROCODONE BITARTRATE AND ACETAMINOPHEN 7.5; 325 MG/1; MG/1
1 TABLET ORAL EVERY 6 HOURS PRN
Qty: 12 TABLET | Refills: 0 | Status: SHIPPED | OUTPATIENT
Start: 2023-07-01

## 2023-07-01 RX ORDER — IBUPROFEN 600 MG/1
600 TABLET ORAL EVERY 6 HOURS PRN
Qty: 30 TABLET | Refills: 0 | Status: SHIPPED | OUTPATIENT
Start: 2023-07-01

## 2023-07-01 RX ORDER — CYCLOBENZAPRINE HCL 10 MG
10 TABLET ORAL 2 TIMES DAILY PRN
Qty: 14 TABLET | Refills: 0 | Status: SHIPPED | OUTPATIENT
Start: 2023-07-01 | End: 2023-07-08

## 2023-07-01 RX ORDER — IBUPROFEN 800 MG/1
800 TABLET ORAL
Status: COMPLETED | OUTPATIENT
Start: 2023-07-01 | End: 2023-07-01

## 2023-07-01 RX ORDER — METHYLPREDNISOLONE 4 MG/1
TABLET ORAL
Qty: 1 EACH | Refills: 0 | Status: SHIPPED | OUTPATIENT
Start: 2023-07-01 | End: 2023-07-22

## 2023-07-01 RX ADMIN — IBUPROFEN 800 MG: 800 TABLET, FILM COATED ORAL at 03:07

## 2023-07-01 NOTE — DISCHARGE INSTRUCTIONS
Rest the knee  Discussed PRICE therapy:  Protect the joint with stabilizing brace or taping  Rest the extremity  Ice as many times a day for 20 minute intervals for first 48 hours or until inflammation has stabilized   Compression to provide support and help prevent swelling  Elevation above heart level to help decrease swelling    For pain, may take Ibuprofen or Naprosyn    After 48 hours perform gentle ROM exercises    Should note gradual improvement, if no improvement or condition has worsened follow up with orthopedist

## 2023-07-01 NOTE — FIRST PROVIDER EVALUATION
Medical screening examination initiated.  I have conducted a focused provider triage encounter, findings are as follows:    Brief history of present illness:  Patient is tearful and highly defensive but only states that her left knee hurts.    Vitals:    07/01/23 1413   Pulse: (!) 111   Resp: 20   Temp: 98.2 °F (36.8 °C)   TempSrc: Oral   SpO2: 97%       Pertinent physical exam:  Vital signs stable    Brief workup plan:  X-ray left knee    Preliminary workup initiated; this workup will be continued and followed by the physician or advanced practice provider that is assigned to the patient when roomed.

## 2023-07-01 NOTE — ED PROVIDER NOTES
Encounter Date: 7/1/2023       History     Chief Complaint   Patient presents with    Knee Pain     L knee pain starting Wednesday. Denies trauma     63 yo female presents to the ED with c/o sudden onset of left knee pain. Pt describes a hx of left knee pain in which she received steroid injections in the knee. Pt had a previous fall with resulting left knee pain but that was 8 months ago. Since then, no further pain until now. 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 There is pain to the left anterior knee when bearing weight. She has an antalgic gait with a limp with swelling to the knee area compared to right knee. No obvious skin erythema, bruising, effusion. She does have ROM to the left knee but only notes pain when bearing weight. Tylenol did not improve the pain. Pt asking for steroid injection in knee so she can work tomorrow.     The history is provided by the patient.   Review of patient's allergies indicates:  No Known Allergies  Past Medical History:   Diagnosis Date    Asthma      Past Surgical History:   Procedure Laterality Date    COLONOSCOPY N/A 11/7/2022    Procedure: COLONOSCOPY;  Surgeon: Brea Burgos MD;  Location: [facility];  Service: Endoscopy;  Laterality: N/A;     Family History   Problem Relation Age of Onset    Cancer Mother     Diabetes Mother     Diabetes Father      Social History     Tobacco Use    Smoking status: Every Day     Packs/day: 1.00     Years: 30.00     Pack years: 30.00     Types: Cigarettes    Smokeless tobacco: Never   Substance Use Topics    Alcohol use: Yes     Comment: occ    Drug use: Never     Review of Systems   Constitutional:  Positive for activity change.   HENT: Negative.     Respiratory:  Negative for shortness of breath.    Cardiovascular:  Negative for chest pain.   Gastrointestinal:  Negative for nausea and vomiting.   Genitourinary: Negative.    Musculoskeletal:  Positive for arthralgias and gait problem. Negative for joint  swelling.   Skin:  Negative for color change and wound.   Neurological:  Negative for weakness.   Psychiatric/Behavioral:  The patient is nervous/anxious.      Physical Exam     Initial Vitals   BP Pulse Resp Temp SpO2   07/01/23 1417 07/01/23 1413 07/01/23 1413 07/01/23 1413 07/01/23 1413   (!) 100/59 99 20 98.2 °F (36.8 °C) 97 %      MAP       --                Physical Exam    Vitals reviewed.  Constitutional: She appears well-developed and well-nourished. She appears distressed.   HENT:   Head: Normocephalic and atraumatic.   Eyes: Conjunctivae are normal.   Neck: Neck supple.   Cardiovascular:  Normal rate and regular rhythm.     Friction rub: mildly distressed with facial grimacing and crying.       Pulmonary/Chest: No respiratory distress.   Musculoskeletal:      Cervical back: Neck supple.      Right knee: Swelling: diffuse.      Left knee: Swelling present. No deformity, effusion, erythema or crepitus. Normal range of motion. No tenderness. No LCL laxity, MCL laxity or ACL laxity.     Comments: Diffuse generalized swelling of left knee  Antalgic gait and limp noted  FROM  Pain noted with bearing weight     Skin: Skin is warm and dry.   Psychiatric: Her mood appears anxious.       ED Course   Procedures  Labs Reviewed - No data to display       Imaging Results              X-Ray Knee 1 or 2 View Left (Final result)  Result time 07/01/23 14:54:04      Final result by Lindsay Fontaine MD (07/01/23 14:54:04)                   Impression:      No acute fracture or dislocation or two-view exam limited.  Joint spaces maintained mild to moderate osteoarthritis..      Electronically signed by: Lindsay Fontaine  Date:    07/01/2023  Time:    14:54               Narrative:    EXAMINATION:  XR KNEE 1 OR 2 VIEW LEFT    CLINICAL HISTORY:  Pain in unspecified knee    COMPARISON:  None available                                       Medications   ibuprofen tablet 800 mg (has no administration in time range)          Acute pain of left knee  -     Ambulatory referral/consult to Orthopedics; Future; Expected date: 07/08/2023    Knee pain  -     X-Ray Knee 1 or 2 View Left; Standing    Primary osteoarthritis of left knee  -     Ambulatory referral/consult to Orthopedics; Future; Expected date: 07/08/2023    Other orders  -     ibuprofen tablet 800 mg  -     cyclobenzaprine (FLEXERIL) 10 MG tablet; Take 1 tablet (10 mg total) by mouth 2 (two) times daily as needed for Muscle spasms.  Dispense: 14 tablet; Refill: 0  -     HYDROcodone-acetaminophen (NORCO) 7.5-325 mg per tablet; Take 1 tablet by mouth every 6 (six) hours as needed for Pain.  Dispense: 12 tablet; Refill: 0  -     ibuprofen (ADVIL,MOTRIN) 600 MG tablet; Take 1 tablet (600 mg total) by mouth every 6 (six) hours as needed for Pain (Inflammation).  Dispense: 30 tablet; Refill: 0  -     methylPREDNISolone (MEDROL DOSEPACK) 4 mg tablet; Take according to directions  Dispense: 1 each; Refill: 0  -     Crutches; Standing  -     Apply ace wrap; Standing                          Clinical Impression:   Final diagnoses:  [M25.569] Knee pain  [M25.562] Acute pain of left knee (Primary)               Savannah Roberts NP  07/01/23 4301